# Patient Record
Sex: MALE | Race: WHITE | NOT HISPANIC OR LATINO | Employment: OTHER | URBAN - METROPOLITAN AREA
[De-identification: names, ages, dates, MRNs, and addresses within clinical notes are randomized per-mention and may not be internally consistent; named-entity substitution may affect disease eponyms.]

---

## 2018-04-25 ENCOUNTER — HOSPITAL ENCOUNTER (OUTPATIENT)
Dept: LAB | Facility: HOSPITAL | Age: 63
Discharge: HOME | End: 2018-04-25

## 2020-01-19 ENCOUNTER — HOSPITAL ENCOUNTER (INPATIENT)
Facility: HOSPITAL | Age: 65
LOS: 1 days | Discharge: HOME | End: 2020-01-22
Attending: EMERGENCY MEDICINE | Admitting: HOSPITALIST

## 2020-01-19 ENCOUNTER — APPOINTMENT (OUTPATIENT)
Dept: GENERAL RADIOLOGY | Facility: HOSPITAL | Age: 65
End: 2020-01-19

## 2020-01-19 DIAGNOSIS — R94.39 ABNORMAL NUCLEAR STRESS TEST: ICD-10-CM

## 2020-01-19 DIAGNOSIS — I20.8 ANGINA AT REST (HCC): ICD-10-CM

## 2020-01-19 DIAGNOSIS — R07.9 CHEST PAIN, UNSPECIFIED TYPE: Primary | ICD-10-CM

## 2020-01-19 DIAGNOSIS — F10.221: ICD-10-CM

## 2020-01-19 PROBLEM — I10 ESSENTIAL HYPERTENSION: Status: ACTIVE | Noted: 2020-01-19

## 2020-01-19 PROBLEM — K21.9 GERD WITHOUT ESOPHAGITIS: Status: ACTIVE | Noted: 2020-01-19

## 2020-01-19 PROBLEM — N40.0 BPH WITHOUT OBSTRUCTION/LOWER URINARY TRACT SYMPTOMS: Status: ACTIVE | Noted: 2020-01-19

## 2020-01-19 LAB
ALBUMIN SERPL-MCNC: 4.6 G/DL (ref 3.5–5.2)
ALBUMIN/GLOB SERPL: 1.2 G/DL
ALP SERPL-CCNC: 87 U/L (ref 39–117)
ALT SERPL W P-5'-P-CCNC: 7 U/L (ref 1–41)
AMPHET+METHAMPHET UR QL: NEGATIVE
ANION GAP SERPL CALCULATED.3IONS-SCNC: 20 MMOL/L (ref 5–15)
AST SERPL-CCNC: 22 U/L (ref 1–40)
BARBITURATES UR QL SCN: NEGATIVE
BASOPHILS # BLD AUTO: 0.1 10*3/MM3 (ref 0–0.2)
BASOPHILS NFR BLD AUTO: 0.9 % (ref 0–1.5)
BENZODIAZ UR QL SCN: NEGATIVE
BILIRUB SERPL-MCNC: 0.4 MG/DL (ref 0.2–1.2)
BUN BLD-MCNC: 22 MG/DL (ref 8–23)
BUN/CREAT SERPL: 20.8 (ref 7–25)
CALCIUM SPEC-SCNC: 9.7 MG/DL (ref 8.6–10.5)
CANNABINOIDS SERPL QL: NEGATIVE
CHLORIDE SERPL-SCNC: 101 MMOL/L (ref 98–107)
CO2 SERPL-SCNC: 23 MMOL/L (ref 22–29)
COCAINE UR QL: NEGATIVE
CREAT BLD-MCNC: 1.06 MG/DL (ref 0.76–1.27)
DEPRECATED RDW RBC AUTO: 49.4 FL (ref 37–54)
EOSINOPHIL # BLD AUTO: 0.2 10*3/MM3 (ref 0–0.4)
EOSINOPHIL NFR BLD AUTO: 2.4 % (ref 0.3–6.2)
ERYTHROCYTE [DISTWIDTH] IN BLOOD BY AUTOMATED COUNT: 14.4 % (ref 12.3–15.4)
ETHANOL UR QL: 0.19 %
GFR SERPL CREATININE-BSD FRML MDRD: 70 ML/MIN/1.73
GLOBULIN UR ELPH-MCNC: 3.8 GM/DL
GLUCOSE BLD-MCNC: 66 MG/DL (ref 65–99)
HCT VFR BLD AUTO: 43.9 % (ref 37.5–51)
HGB BLD-MCNC: 15.6 G/DL (ref 13–17.7)
LYMPHOCYTES # BLD AUTO: 3 10*3/MM3 (ref 0.7–3.1)
LYMPHOCYTES NFR BLD AUTO: 30.7 % (ref 19.6–45.3)
MCH RBC QN AUTO: 34.7 PG (ref 26.6–33)
MCHC RBC AUTO-ENTMCNC: 35.6 G/DL (ref 31.5–35.7)
MCV RBC AUTO: 97.4 FL (ref 79–97)
METHADONE UR QL SCN: NEGATIVE
MONOCYTES # BLD AUTO: 0.7 10*3/MM3 (ref 0.1–0.9)
MONOCYTES NFR BLD AUTO: 7.4 % (ref 5–12)
NEUTROPHILS # BLD AUTO: 5.7 10*3/MM3 (ref 1.7–7)
NEUTROPHILS NFR BLD AUTO: 58.6 % (ref 42.7–76)
NRBC BLD AUTO-RTO: 0.1 /100 WBC (ref 0–0.2)
OPIATES UR QL: NEGATIVE
OXYCODONE UR QL SCN: NEGATIVE
PLATELET # BLD AUTO: 306 10*3/MM3 (ref 140–450)
PMV BLD AUTO: 7.3 FL (ref 6–12)
POTASSIUM BLD-SCNC: 3.2 MMOL/L (ref 3.5–5.2)
PROT SERPL-MCNC: 8.4 G/DL (ref 6–8.5)
RBC # BLD AUTO: 4.5 10*6/MM3 (ref 4.14–5.8)
SODIUM BLD-SCNC: 144 MMOL/L (ref 136–145)
TROPONIN T SERPL-MCNC: <0.01 NG/ML (ref 0–0.03)
WBC NRBC COR # BLD: 9.8 10*3/MM3 (ref 3.4–10.8)
WHOLE BLOOD HOLD SPECIMEN: NORMAL

## 2020-01-19 PROCEDURE — G0378 HOSPITAL OBSERVATION PER HR: HCPCS

## 2020-01-19 PROCEDURE — 25010000002 HEPARIN (PORCINE) PER 1000 UNITS: Performed by: HOSPITALIST

## 2020-01-19 PROCEDURE — 80307 DRUG TEST PRSMV CHEM ANLYZR: CPT | Performed by: HOSPITALIST

## 2020-01-19 PROCEDURE — 25010000002 THIAMINE PER 100 MG: Performed by: EMERGENCY MEDICINE

## 2020-01-19 PROCEDURE — 80053 COMPREHEN METABOLIC PANEL: CPT | Performed by: EMERGENCY MEDICINE

## 2020-01-19 PROCEDURE — 25010000002 MAGNESIUM SULFATE PER 500 MG OF MAGNESIUM: Performed by: EMERGENCY MEDICINE

## 2020-01-19 PROCEDURE — 99219 PR INITIAL OBSERVATION CARE/DAY 50 MINUTES: CPT | Performed by: HOSPITALIST

## 2020-01-19 PROCEDURE — 84484 ASSAY OF TROPONIN QUANT: CPT | Performed by: EMERGENCY MEDICINE

## 2020-01-19 PROCEDURE — 93005 ELECTROCARDIOGRAM TRACING: CPT

## 2020-01-19 PROCEDURE — 80307 DRUG TEST PRSMV CHEM ANLYZR: CPT | Performed by: EMERGENCY MEDICINE

## 2020-01-19 PROCEDURE — 85025 COMPLETE CBC W/AUTO DIFF WBC: CPT | Performed by: EMERGENCY MEDICINE

## 2020-01-19 PROCEDURE — 71045 X-RAY EXAM CHEST 1 VIEW: CPT

## 2020-01-19 PROCEDURE — 99284 EMERGENCY DEPT VISIT MOD MDM: CPT

## 2020-01-19 PROCEDURE — 93005 ELECTROCARDIOGRAM TRACING: CPT | Performed by: EMERGENCY MEDICINE

## 2020-01-19 PROCEDURE — 84484 ASSAY OF TROPONIN QUANT: CPT | Performed by: HOSPITALIST

## 2020-01-19 RX ORDER — ASPIRIN 325 MG
325 TABLET ORAL ONCE
Status: COMPLETED | OUTPATIENT
Start: 2020-01-19 | End: 2020-01-19

## 2020-01-19 RX ORDER — DULOXETIN HYDROCHLORIDE 60 MG/1
60 CAPSULE, DELAYED RELEASE ORAL DAILY
COMMUNITY

## 2020-01-19 RX ORDER — TAMSULOSIN HYDROCHLORIDE 0.4 MG/1
0.8 CAPSULE ORAL DAILY
Status: DISCONTINUED | OUTPATIENT
Start: 2020-01-19 | End: 2020-01-22 | Stop reason: HOSPADM

## 2020-01-19 RX ORDER — HYDROCHLOROTHIAZIDE 25 MG/1
25 TABLET ORAL DAILY
COMMUNITY
End: 2020-04-06 | Stop reason: HOSPADM

## 2020-01-19 RX ORDER — TAMSULOSIN HYDROCHLORIDE 0.4 MG/1
2 CAPSULE ORAL DAILY
COMMUNITY

## 2020-01-19 RX ORDER — DULOXETIN HYDROCHLORIDE 30 MG/1
60 CAPSULE, DELAYED RELEASE ORAL DAILY
Status: DISCONTINUED | OUTPATIENT
Start: 2020-01-19 | End: 2020-01-22 | Stop reason: HOSPADM

## 2020-01-19 RX ORDER — OMEPRAZOLE 20 MG/1
20 CAPSULE, DELAYED RELEASE ORAL DAILY
COMMUNITY

## 2020-01-19 RX ORDER — HYDROCHLOROTHIAZIDE 25 MG/1
25 TABLET ORAL DAILY
Status: DISCONTINUED | OUTPATIENT
Start: 2020-01-19 | End: 2020-01-22 | Stop reason: HOSPADM

## 2020-01-19 RX ORDER — UBIDECARENONE 100 MG
100 CAPSULE ORAL DAILY
COMMUNITY

## 2020-01-19 RX ORDER — SODIUM CHLORIDE 0.9 % (FLUSH) 0.9 %
10 SYRINGE (ML) INJECTION EVERY 12 HOURS SCHEDULED
Status: DISCONTINUED | OUTPATIENT
Start: 2020-01-19 | End: 2020-01-22 | Stop reason: HOSPADM

## 2020-01-19 RX ORDER — ACETAMINOPHEN,DIPHENHYDRAMINE HCL 500; 25 MG/1; MG/1
1 TABLET, FILM COATED ORAL NIGHTLY PRN
COMMUNITY
End: 2020-01-22 | Stop reason: HOSPADM

## 2020-01-19 RX ORDER — HEPARIN SODIUM 5000 [USP'U]/ML
5000 INJECTION, SOLUTION INTRAVENOUS; SUBCUTANEOUS EVERY 12 HOURS SCHEDULED
Status: DISCONTINUED | OUTPATIENT
Start: 2020-01-19 | End: 2020-01-22 | Stop reason: HOSPADM

## 2020-01-19 RX ORDER — SODIUM CHLORIDE 0.9 % (FLUSH) 0.9 %
10 SYRINGE (ML) INJECTION AS NEEDED
Status: DISCONTINUED | OUTPATIENT
Start: 2020-01-19 | End: 2020-01-22 | Stop reason: HOSPADM

## 2020-01-19 RX ORDER — PANTOPRAZOLE SODIUM 40 MG/1
40 TABLET, DELAYED RELEASE ORAL EVERY MORNING
Status: DISCONTINUED | OUTPATIENT
Start: 2020-01-20 | End: 2020-01-22 | Stop reason: HOSPADM

## 2020-01-19 RX ADMIN — DULOXETINE HYDROCHLORIDE 60 MG: 30 CAPSULE, DELAYED RELEASE ORAL at 13:27

## 2020-01-19 RX ADMIN — Medication 10 ML: at 20:52

## 2020-01-19 RX ADMIN — FOLIC ACID 1000 ML/HR: 5 INJECTION, SOLUTION INTRAMUSCULAR; INTRAVENOUS; SUBCUTANEOUS at 11:49

## 2020-01-19 RX ADMIN — HYDROCHLOROTHIAZIDE 25 MG: 25 TABLET ORAL at 13:27

## 2020-01-19 RX ADMIN — NITROGLYCERIN 1 INCH: 20 OINTMENT TOPICAL at 09:51

## 2020-01-19 RX ADMIN — HEPARIN SODIUM 5000 UNITS: 5000 INJECTION INTRAVENOUS; SUBCUTANEOUS at 20:53

## 2020-01-19 RX ADMIN — THIAMINE HYDROCHLORIDE 100 MG: 100 INJECTION, SOLUTION INTRAMUSCULAR; INTRAVENOUS at 10:24

## 2020-01-19 RX ADMIN — TAMSULOSIN HYDROCHLORIDE 0.8 MG: 0.4 CAPSULE ORAL at 13:27

## 2020-01-19 RX ADMIN — Medication 10 ML: at 13:28

## 2020-01-19 RX ADMIN — ASPIRIN 325 MG ORAL TABLET 325 MG: 325 PILL ORAL at 09:51

## 2020-01-19 NOTE — NURSING NOTE
Patient's brother called earlier and I was giving pt care. Attempted to call brother but unsuccessful. Will try later.

## 2020-01-19 NOTE — ED NOTES
Medical records request for problem list and [facility] @[phone]     Kelsy iNck, RegSched Rep  01/19/20 1516

## 2020-01-19 NOTE — ED PROVIDER NOTES
Subjective   64-year-old male complaining of chest pain with shortness of breath and diaphoresis this morning.  He states his been having chest pain intermittently with exertion for about 2 weeks.  [facility] and he had to wait 2-1/2 hours in the ER to be seen so he told them to 'get Fucked' and left on his own accord.  He reports no recent fever chills or cough.  He denies diaphoresis with previous episodes but reports it today.  He also states that he has had palpitations for some time.  He states that he has not recently had a stress test          Review of Systems   HENT: Negative for sore throat.    Respiratory: Positive for chest tightness and shortness of breath. Negative for wheezing and stridor.    Cardiovascular: Positive for chest pain and palpitations. Negative for leg swelling.   Gastrointestinal: Negative for abdominal distention and abdominal pain.   Hematological: Does not bruise/bleed easily.   All other systems reviewed and are negative.      Past Medical History:   Diagnosis Date   • Enlarged prostate    • High cholesterol        No Known Allergies    History reviewed. No pertinent surgical history.    History reviewed. No pertinent family history.    Social History     Socioeconomic History   • Marital status:      Spouse name: Not on file   • Number of children: Not on file   • Years of education: Not on file   • Highest education level: Not on file           Objective   Physical Exam  Alert Fischer Coma Scale 15 appears intoxicated states he was drinking bourbon this morning   HEENT: Pupils equal and reactive to light.  Horizontal nystagmus is noted bilaterally conjunctivae are not injected. normal tympanic membranes. Oropharynx and nares are normal.   Neck: Supple. Midline trachea. No JVD. No goiter.   Chest: Clear and equal breath sounds bilaterally regular rate and rhythm without murmur or rub.   Abdomen: Positive bowel sounds nontender nondistended. No rebound  or peritoneal signs. No CVA tenderness.   Extremities no clubbing cyanosis or edema motor sensory exam is normal the full range of motion is intact.  No asterixis or liver flap   skin: Warm and dry, no rashes or petechia.   Lymphatic: No regional lymphadenopathy. No calf pain, swelling or Angel's sign    Procedures           ED Course  ED Course as of Jan 19 1107   Sun Jan 19, 2020   1100 Liver functions were noted to be normal    [TH]   1101 No additional chest pain after application of Nitropaste states he felt better with IV fluids    [TH]      ED Course User Index  [TH] Avtar Vo MD           Labs Reviewed   COMPREHENSIVE METABOLIC PANEL - Abnormal; Notable for the following components:       Result Value    Potassium 3.2 (*)     Anion Gap 20.0 (*)     All other components within normal limits    Narrative:     GFR Normal >60  Chronic Kidney Disease <60  Kidney Failure <15     CBC WITH AUTO DIFFERENTIAL - Abnormal; Notable for the following components:    MCV 97.4 (*)     MCH 34.7 (*)     All other components within normal limits   TROPONIN (IN-HOUSE) - Normal    Narrative:     Troponin T Reference Range:  <= 0.03 ng/mL-   Negative for AMI  >0.03 ng/mL-     Abnormal for myocardial necrosis.  Clinicians would have to utilize clinical acumen, EKG, Troponin and serial changes to determine if it is an Acute Myocardial Infarction or myocardial injury due to an underlying chronic condition.       Results may be falsely decreased if patient taking Biotin.     ETHANOL    Narrative:     Plasma Ethanol Clinical Symptoms:    ETOH (%)               Clinical Symptom  .01-.05              No apparent influence  .03-.12              Euphoria, Diminished judgment and attention   .09-.25              Impaired comprehension, Muscle incoordination  .18-.30              Confusion, Staggered gait, Slurred speech  .25-.40              Markedly decreased response to stimuli, unable to stand or                        walk,  vomitting, sleep or stupor  .35-.50              Comatose, Anesthesia, Subnormal body temperature       URINE DRUG SCREEN   CBC AND DIFFERENTIAL    Narrative:     The following orders were created for panel order CBC & Differential.  Procedure                               Abnormality         Status                     ---------                               -----------         ------                     CBC Auto Differential[[id number]]        Abnormal            Final result                 Please view results for these tests on the individual orders.   EXTRA TUBES    Narrative:     The following orders were created for panel order Extra Tubes.  Procedure                               Abnormality         Status                     ---------                               -----------         ------                     Light Blue Top[[id number]]                                   Final result                 Please view results for these tests on the individual orders.   LIGHT BLUE TOP     Medications   multiple vitamin (M.V.I. Adult) 10 mL, thiamine (B-1) 100 mg, folic acid 1 mg, magnesium sulfate 2 g in sodium chloride 0.9 % 1,000 mL infusion (has no administration in time range)   aspirin tablet 325 mg (325 mg Oral Given 1/19/20 0951)   nitroglycerin (NITROSTAT) ointment 1 inch (1 inch Topical Given 1/19/20 0951)   thiamine (B-1) 100 mg in sodium chloride 0.9 % 100 mL IVPB (0 mg Intravenous Stopped 1/19/20 1051)     Xr Chest 1 View    Result Date: 1/19/2020   1. Normal portable chest 2. I have no comparison  Electronically Signed By-Greg Becerril Jr. On:1/19/2020 10:18 AM This report was finalized on [id number] by  Greg Becerril Jr., .                                        MDM  Number of Diagnoses or Management Options     Amount and/or Complexity of Data Reviewed  Clinical lab tests: reviewed  Tests in the radiology section of CPT®: reviewed  Tests in the medicine section of CPT®: reviewed  Review and summarize  past medical records: yes  Discuss the patient with other providers: yes  Independent visualization of images, tracings, or specimens: yes    Risk of Complications, Morbidity, and/or Mortality  Presenting problems: high  Diagnostic procedures: high  Management options: high  General comments: [facility] however at time of dictation have not received any documentation.  The patient case was discussed the hospitalist the patient will be admitted for serial troponin and EKG.  The patient will need stress Myoview.  He is agreeable with this plan of treatment.        Final diagnoses:   Chest pain, unspecified type   Acute alcohol intoxication delirium with moderate or severe use disorder (CMS/[facility])            Avtar Vo MD  01/19/20 4182

## 2020-01-19 NOTE — H&P
[facility] Medicine Services      Patient Name: Rodolfo Regan  : 1955  MRN: [id number]  Primary Care Physician: Sancho Dover  Date of admission: 2020    Patient Care Team:  Sancho Dover as PCP - General (Internal Medicine)          Subjective   History Present Illness   Chest pain.     Chief Complaint:   Chief Complaint   Patient presents with   • Chest Pain     HPI.    Mr. Regan is a 64 y.o.  presents to [facility] complaint of chest pain, retrosternal, nonradiating.  No association with exertion, relieved somewhat by sublingual nitro.  Underwent EKG initial cardiomyopathy, negative.  Patient denies any previous diagnosis of coronary artery disease.  Patient does carries a medical diagnosis of hypertension, GERD, BPH.  Following this we were asked to admit the patient for the further care.         History of Present Illness    Review of Systems   Constitution: Negative.   HENT: Negative.    Eyes: Negative.    Cardiovascular: Negative.    Respiratory: Negative.    Endocrine: Negative.    Hematologic/Lymphatic: Negative.    Skin: Negative.    Musculoskeletal: Negative.    Gastrointestinal: Negative.    Genitourinary: Negative.    Neurological: Negative.    Psychiatric/Behavioral: Negative.    Allergic/Immunologic: Negative.    All other systems reviewed and are negative.          Personal History     Past Medical History:   Past Medical History:   Diagnosis Date   • Enlarged prostate    • High cholesterol    • Hypertension        Surgical History:      Past Surgical History:   Procedure Laterality Date   • COLONOSCOPY             Family History: family history includes Diabetes in his sister; Heart disease in his brother, father, and mother. Otherwise pertinent FHx was reviewed and unremarkable.     Social History:  reports that he has been smoking cigarettes. He has a 7.50 pack-year smoking history. He has never used smokeless tobacco. He reports that he drinks  about 10.0 standard drinks of alcohol per week. He reports that he does not use drugs.      Medications:  Prior to Admission medications    Medication Sig Start Date End Date Taking? Authorizing Provider   CBD oil (cannabidiol) capsule Take 1 capsule by mouth Daily.   Yes Teetee Garg MD   coenzyme Q10 100 MG capsule Take 100 mg by mouth Daily.   Yes Teetee Garg MD   diphenhydrAMINE-acetaminophen (TYLENOL PM)  MG tablet per tablet Take 1 tablet by mouth At Night As Needed for Sleep.   Yes Teetee Garg MD   DULoxetine (CYMBALTA) 60 MG capsule Take 60 mg by mouth Daily.   Yes Teetee Garg MD   hydroCHLOROthiazide (HYDRODIURIL) 25 MG tablet Take 25 mg by mouth Daily.   Yes Teetee Garg MD   Multiple Vitamins-Minerals (CENTRUM ADULTS PO) Take 1 tablet by mouth Daily.   Yes Teetee Garg MD   NON FORMULARY Take 1 tablet by mouth Daily. OTC Nitric Oxide Support   Yes Teetee Garg MD   omeprazole (priLOSEC) 20 MG capsule Take 20 mg by mouth Daily.   Yes Teetee Garg MD   tamsulosin (FLOMAX) 0.4 MG capsule 24 hr capsule Take 2 capsules by mouth Daily.   Yes Teetee Garg MD       Allergies:  No Known Allergies    Objective   Objective     Vital Signs  Temp:  [97.9 °F (36.6 °C)] 97.9 °F (36.6 °C)  Heart Rate:  [90] 90  Resp:  [15] 15  BP: (149)/(82) 149/82  SpO2:  [93 %] 93 %  on   ;      Body mass index is 25.85 kg/m².    Physical Exam   Constitutional: He is oriented to person, place, and time. He appears well-developed and well-nourished. No distress.   HENT:   Head: Normocephalic and atraumatic.   Right Ear: External ear normal.   Left Ear: External ear normal.   Nose: Nose normal.   Mouth/Throat: Oropharynx is clear and moist. No oropharyngeal exudate.   Eyes: Pupils are equal, round, and reactive to light. Conjunctivae and EOM are normal. Right eye exhibits no discharge. Left eye exhibits no discharge. No scleral icterus.   Neck:  Normal range of motion. No JVD present. No tracheal deviation present. No thyromegaly present.   Cardiovascular: Normal rate, regular rhythm, normal heart sounds and intact distal pulses. Exam reveals no gallop and no friction rub.   No murmur heard.  Pulmonary/Chest: Effort normal and breath sounds normal. No stridor. No respiratory distress. He has no wheezes. He has no rales. He exhibits no tenderness.   Abdominal: Soft. Bowel sounds are normal. He exhibits no distension and no mass. There is no tenderness. There is no rebound and no guarding. No hernia.   Musculoskeletal: Normal range of motion. He exhibits no edema, tenderness or deformity.   Lymphadenopathy:     He has no cervical adenopathy.   Neurological: He is alert and oriented to person, place, and time. No cranial nerve deficit or sensory deficit. He exhibits normal muscle tone. Coordination normal.   Skin: Skin is warm and dry. No rash noted. He is not diaphoretic. No erythema.   Psychiatric: He has a normal mood and affect. His behavior is normal.   Nursing note and vitals reviewed.      Results Review:  I have personally reviewed most recent lab results and agree with findings, most notably: .    Results from last 7 days   Lab Units 01/19/20  0920   WBC 10*3/mm3 9.80   HEMOGLOBIN g/dL 15.6   HEMATOCRIT % 43.9   PLATELETS 10*3/mm3 306     Results from last 7 days   Lab Units 01/19/20  0920   SODIUM mmol/L 144   POTASSIUM mmol/L 3.2*   CHLORIDE mmol/L 101   CO2 mmol/L 23.0   BUN mg/dL 22   CREATININE mg/dL 1.06   GLUCOSE mg/dL 66   CALCIUM mg/dL 9.7   ALT (SGPT) U/L 7   AST (SGOT) U/L 22   TROPONIN T ng/mL <0.010     Estimated Creatinine Clearance: 75.8 mL/min (by C-G formula based on SCr of 1.06 mg/dL).  Brief Urine Lab Results     None          Microbiology Results (last 10 days)     ** No results found for the last 240 hours. **          ECG/EMG Results (most recent)     Procedure Component Value Units Date/Time    ECG 12 Lead [[id number]] Collected:   01/19/20 0920     Updated:  01/19/20 0922    Narrative:       HEART RATE= 94  bpm  RR Interval= 640  ms  SD Interval= 144  ms  P Horizontal Axis= -53  deg  P Front Axis= 54  deg  QRSD Interval= 74  ms  QT Interval= 361  ms  QRS Axis= 52  deg  T Wave Axis= 64  deg  - ABNORMAL ECG -  Sinus rhythm  Anterior infarct, age indeterminate  Electronically Signed By:   Date and Time of Study: 2020-01-19 09:20:49                    Xr Chest 1 View    Result Date: 1/19/2020   1. Normal portable chest 2. I have no comparison  Electronically Signed By-Greg Becerril Jr. On:1/19/2020 10:18 AM This report was finalized on [id number] by  Greg Becerril Jr., .        Estimated Creatinine Clearance: 75.8 mL/min (by C-G formula based on SCr of 1.06 mg/dL).    Assessment/Plan   Assessment/Plan       Active Hospital Problems    Diagnosis  POA   • Chest pain [R07.9]  Yes      Resolved Hospital Problems   No resolved problems to display.       Active Hospital Problems:  No notes have been filed under this hospital service.  Service: Hospitalist    Assessment / Plan    Chest pain rule out acute kidney syndrome, telemetry, serial markers, echo stress test in the morning.    Hypertension, continue hydrochlorothiazide, monitor vitals.    BPH, continue Flomax, monitor for retention.    Gastroesophageal for disease, continue Protonix monitor for symptoms.          VTE Prophylaxis - SCDs.    CODE STATUS:    Code Status and Medical Interventions:   Ordered at: 01/19/20 1255     Level Of Support Discussed With:    Patient     Code Status:    CPR     Medical Interventions (Level of Support Prior to Arrest):    Full       Admission Status:  I believe this patient meets  observation  criteria.      I discussed the patient's findings and my recommendations with patient and family.        Electronically signed by Susana Taylor MD, 01/19/20, 12:55 PM.  [facility] Hospitalist Team

## 2020-01-19 NOTE — PLAN OF CARE
Problem: Patient Care Overview  Goal: Plan of Care Review  Flowsheets (Taken 1/19/2020 1257)  Plan of Care Reviewed With: patient  Note:   Educated pt to room, call light, and condition. Pt alert but ETOH protocol in place. Pt calm and cooperative at this time.   Goal: Discharge Needs Assessment  Flowsheets  Taken 1/19/2020 1257  Equipment Needed After Discharge: none  Transportation Anticipated: family or friend will provide  Transportation Concerns: car, none  Current Discharge Risk: psychiatric illness  Concerns to be Addressed: substance/tobacco abuse/use;mental health  Readmission Within the Last 30 Days: no previous admission in last 30 days  Taken 1/19/2020 1201  Equipment Currently Used at Home: none  Taken 1/19/2020 1204  Patient/Family Anticipated Services at Transition: mental health services  Patient/Family Anticipates Transition to: home with family  Note:   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      Problem: Cardiac: ACS (Acute Coronary Syndrome) (Adult)  Goal: Signs and Symptoms of Listed Potential Problems Will be Absent, Minimized or Managed (Cardiac: ACS)  Flowsheets (Taken 1/19/2020 1257)  Problems Assessed (Acute Coronary Syndrome): all  Problems Present (Acute Coronary Syn): chest pain (angina)  Note:   Denies pain at this time. Reports weakness with no history of falls but periods of blacking out at home. Pt lives at home with brother.      Problem: Alcohol Withdrawal Acute, Risk/Actual (Adult)  Goal: Signs and Symptoms of Listed Potential Problems Will be Absent, Minimized or Managed (Alcohol Withdrawal Acute, Risk/Actual)  Flowsheets (Taken 1/19/2020 1257)  Problems Assessed (Alcohol Withdrawal Syndrome): all  Problems Present (Alcohol W/D Syndrome): effects of MELVA (alcohol withdrawal syndrome)  Note:   Alert and oriented. Calm. Pt reports drinking all last night into this morning. Unsure of how much he drank.

## 2020-01-20 ENCOUNTER — APPOINTMENT (OUTPATIENT)
Dept: NUCLEAR MEDICINE | Facility: HOSPITAL | Age: 65
End: 2020-01-20

## 2020-01-20 ENCOUNTER — APPOINTMENT (OUTPATIENT)
Dept: CARDIOLOGY | Facility: HOSPITAL | Age: 65
End: 2020-01-20

## 2020-01-20 LAB
BH CV NUCLEAR PRIOR STUDY: 3
BH CV STRESS BP STAGE 1: NORMAL
BH CV STRESS BP STAGE 2: NORMAL
BH CV STRESS DURATION MIN STAGE 1: 3
BH CV STRESS DURATION MIN STAGE 2: 3
BH CV STRESS DURATION SEC STAGE 1: 0
BH CV STRESS DURATION SEC STAGE 2: 0
BH CV STRESS GRADE STAGE 1: 10
BH CV STRESS GRADE STAGE 2: 12
BH CV STRESS HR STAGE 1: 141
BH CV STRESS HR STAGE 2: 146
BH CV STRESS METS STAGE 1: 5
BH CV STRESS METS STAGE 2: 7.5
BH CV STRESS PROTOCOL 1: NORMAL
BH CV STRESS RECOVERY BP: NORMAL MMHG
BH CV STRESS RECOVERY HR: 102 BPM
BH CV STRESS SPEED STAGE 1: 1.7
BH CV STRESS SPEED STAGE 2: 2.5
BH CV STRESS STAGE 1: 1
BH CV STRESS STAGE 2: 2
LV EF NUC BP: 56 %
MAXIMAL PREDICTED HEART RATE: 155 BPM
PERCENT MAX PREDICTED HR: 94.19 %
STRESS BASELINE BP: NORMAL MMHG
STRESS BASELINE HR: 118 BPM
STRESS PERCENT HR: 111 %
STRESS POST ESTIMATED WORKLOAD: 4.6 METS
STRESS POST EXERCISE DUR MIN: 3 MIN
STRESS POST EXERCISE DUR SEC: 36 SEC
STRESS POST PEAK BP: NORMAL MMHG
STRESS POST PEAK HR: 146 BPM
STRESS TARGET HR: 132 BPM
TROPONIN T SERPL-MCNC: <0.01 NG/ML (ref 0–0.03)

## 2020-01-20 PROCEDURE — 93018 CV STRESS TEST I&R ONLY: CPT | Performed by: INTERNAL MEDICINE

## 2020-01-20 PROCEDURE — G0378 HOSPITAL OBSERVATION PER HR: HCPCS

## 2020-01-20 PROCEDURE — 93306 TTE W/DOPPLER COMPLETE: CPT

## 2020-01-20 PROCEDURE — 93016 CV STRESS TEST SUPVJ ONLY: CPT | Performed by: NURSE PRACTITIONER

## 2020-01-20 PROCEDURE — 78452 HT MUSCLE IMAGE SPECT MULT: CPT | Performed by: INTERNAL MEDICINE

## 2020-01-20 PROCEDURE — 93017 CV STRESS TEST TRACING ONLY: CPT

## 2020-01-20 PROCEDURE — 84484 ASSAY OF TROPONIN QUANT: CPT | Performed by: HOSPITALIST

## 2020-01-20 PROCEDURE — 25010000002 HEPARIN (PORCINE) PER 1000 UNITS: Performed by: HOSPITALIST

## 2020-01-20 PROCEDURE — A9500 TC99M SESTAMIBI: HCPCS | Performed by: INTERNAL MEDICINE

## 2020-01-20 PROCEDURE — 0 TECHNETIUM SESTAMIBI: Performed by: INTERNAL MEDICINE

## 2020-01-20 PROCEDURE — 78452 HT MUSCLE IMAGE SPECT MULT: CPT

## 2020-01-20 PROCEDURE — 93306 TTE W/DOPPLER COMPLETE: CPT | Performed by: INTERNAL MEDICINE

## 2020-01-20 PROCEDURE — 99222 1ST HOSP IP/OBS MODERATE 55: CPT | Performed by: INTERNAL MEDICINE

## 2020-01-20 PROCEDURE — 99225 PR SBSQ OBSERVATION CARE/DAY 25 MINUTES: CPT | Performed by: INTERNAL MEDICINE

## 2020-01-20 RX ORDER — POTASSIUM CHLORIDE 20 MEQ/1
20 TABLET, EXTENDED RELEASE ORAL ONCE
Status: COMPLETED | OUTPATIENT
Start: 2020-01-20 | End: 2020-01-20

## 2020-01-20 RX ORDER — MIDAZOLAM HYDROCHLORIDE 1 MG/ML
1 INJECTION INTRAMUSCULAR; INTRAVENOUS ONCE
Status: CANCELLED | OUTPATIENT
Start: 2020-01-20 | End: 2020-01-20

## 2020-01-20 RX ADMIN — PANTOPRAZOLE SODIUM 40 MG: 40 TABLET, DELAYED RELEASE ORAL at 06:04

## 2020-01-20 RX ADMIN — TECHNETIUM TC 99M SESTAMIBI 1 DOSE: 1 INJECTION INTRAVENOUS at 09:40

## 2020-01-20 RX ADMIN — HEPARIN SODIUM 5000 UNITS: 5000 INJECTION INTRAVENOUS; SUBCUTANEOUS at 21:33

## 2020-01-20 RX ADMIN — TAMSULOSIN HYDROCHLORIDE 0.8 MG: 0.4 CAPSULE ORAL at 08:43

## 2020-01-20 RX ADMIN — POTASSIUM CHLORIDE 20 MEQ: 1500 TABLET, EXTENDED RELEASE ORAL at 18:17

## 2020-01-20 RX ADMIN — HEPARIN SODIUM 5000 UNITS: 5000 INJECTION INTRAVENOUS; SUBCUTANEOUS at 08:43

## 2020-01-20 RX ADMIN — Medication 10 ML: at 08:44

## 2020-01-20 RX ADMIN — HYDROCHLOROTHIAZIDE 25 MG: 25 TABLET ORAL at 08:43

## 2020-01-20 RX ADMIN — DULOXETINE HYDROCHLORIDE 60 MG: 30 CAPSULE, DELAYED RELEASE ORAL at 08:43

## 2020-01-20 RX ADMIN — TECHNETIUM TC 99M SESTAMIBI 1 DOSE: 1 INJECTION INTRAVENOUS at 12:00

## 2020-01-20 RX ADMIN — Medication 10 ML: at 21:34

## 2020-01-20 NOTE — PLAN OF CARE
Problem: Patient Care Overview  Goal: Plan of Care Review  Flowsheets (Taken 1/20/2020 0402)  Progress: no change  Plan of Care Reviewed With: patient  Note:   Patient rested well throughout the night without complaints of pain.  He has been NPO since midnight for a stress test. Shows no signs of alcohol withdraw.  Will continue to monitor.

## 2020-01-20 NOTE — PROGRESS NOTES
Discharge Planning Assessment   [facility]     Patient Name: Rodolfo Regan  MRN: [id number]  Today's Date: 1/20/2020    Admit Date: 1/19/2020    Discharge Needs Assessment     Row Name 01/20/20 1441       Living Environment    Lives With  alone    Current Living Arrangements  home/apartment/condo    Primary Care Provided by  self    Provides Primary Care For  no one    Family Caregiver if Needed  sibling(s)    Able to Return to Prior Arrangements  yes       Transition Planning    Patient/Family Anticipates Transition to  home       Discharge Needs Assessment    Readmission Within the Last 30 Days  no previous admission in last 30 days    Concerns to be Addressed  no discharge needs identified    Equipment Needed After Discharge  none        Discharge Plan     Row Name 01/20/20 1437       Plan    Plan  Return home    Plan Comments  Return home        Destination      Coordination has not been started for this encounter.      Durable Medical Equipment       Demographic Summary     Row Name 01/20/20 1438       General Information    Admission Type  observation    Arrived From  emergency department    Referral Source  admission list    Reason for Consult  discharge planning    Preferred Language  English        Functional Status     Row Name 01/20/20 1440       Functional Status    Usual Activity Tolerance  good    Current Activity Tolerance  good       Functional Status, IADL    Medications  independent       Mental Status    General Appearance WDL  WDL   Plan to return home upon discharge pending stress test results    Sunshine Romero RN

## 2020-01-20 NOTE — PROGRESS NOTES
"      [facility] Medicine Services Daily Progress Note      Hospitalist Team  LOS 0 days      Patient Care Team:  Sancho Dover as PCP - General (Internal Medicine)    Patient Location: 356/1      Subjective   Subjective     Chief Complaint / Subjective  Chief Complaint   Patient presents with   • Chest Pain       Present on Admission:  • Chest pain      Brief Synopsis of Hospital Course/HPI    64-year-old man with medical history significant for hypertension GERD and BPH.  Presented to the ED with complaints of chest pain which is retrosternal, intermittent, nonradiating with no known aggravating factor but relief with sublingual nitro.  After evaluated in the ED, patient was admitted for further care.          Date:: 1/20/2020  Continues with intermittent substernal chest pain  Had Myoview stress test today which was abnormal  Cardiologist consulted        Review of Systems   Constitution: Negative for chills and fever.   HENT: Negative for congestion.    Cardiovascular: Positive for chest pain.   Respiratory: Negative for shortness of breath.    Gastrointestinal: Negative for nausea.   Genitourinary: Negative for dysuria.   Psychiatric/Behavioral: Negative for altered mental status.         Objective   Objective      Vital Signs  Temp:  [98.1 °F (36.7 °C)-98.2 °F (36.8 °C)] 98.1 °F (36.7 °C)  Heart Rate:  [79-86] 79  Resp:  [16-17] 17  BP: (126-152)/(70-75) 134/72  Oxygen Therapy  SpO2: 96 %  Pulse Oximetry Type: Intermittent  Device (Oxygen Therapy): room air  Flowsheet Rows      First Filed Value   Admission Height  172.7 cm (68\") Documented at 01/19/2020 0916   Admission Weight  77.1 kg (170 lb) Documented at 01/19/2020 0916        Intake & Output (last 3 days)       01/17 0701 - 01/18 0700 01/18 0701 - 01/19 0700 01/19 0701 - 01/20 0700 01/20 0701 - 01/21 0700    P.O.    0    IV Piggyback   100     Total Intake(mL/kg)   100 (1.3) 0 (0)    Urine (mL/kg/hr)   890 400 (0.5)    Total Output   " 890 400    Net   -790 -400                Lines, Drains & Airways    Active LDAs     Name:   Placement date:   Placement time:   Site:   Days:    Peripheral IV 01/19/20 0920 Left Antecubital   01/19/20 0920    Antecubital   1                  Physical Exam:    Physical Exam   Constitutional: He is oriented to person, place, and time. He appears well-developed. No distress.   HENT:   Head: Normocephalic and atraumatic.   Eyes: Pupils are equal, round, and reactive to light. EOM are normal.   Neck: Neck supple.   Cardiovascular: Normal rate and regular rhythm.   Pulmonary/Chest: Effort normal and breath sounds normal.   Abdominal: Soft. Bowel sounds are normal.   Musculoskeletal: Normal range of motion.   Neurological: He is alert and oriented to person, place, and time.   Skin: Skin is warm and dry.   Psychiatric: He has a normal mood and affect.   Nursing note and vitals reviewed.            Procedures:              Results Review:     I reviewed the patient's new clinical results.      Lab Results (last 24 hours)     Procedure Component Value Units Date/Time    Troponin [[id number]]  (Normal) Collected:  01/20/20 0424    Specimen:  Blood Updated:  01/20/20 0527     Troponin T <0.010 ng/mL     Narrative:       Troponin T Reference Range:  <= 0.03 ng/mL-   Negative for AMI  >0.03 ng/mL-     Abnormal for myocardial necrosis.  Clinicians would have to utilize clinical acumen, EKG, Troponin and serial changes to determine if it is an Acute Myocardial Infarction or myocardial injury due to an underlying chronic condition.       Results may be falsely decreased if patient taking Biotin.      Troponin [[id number]]  (Normal) Collected:  01/19/20 2141    Specimen:  Blood Updated:  01/19/20 2213     Troponin T <0.010 ng/mL     Narrative:       Troponin T Reference Range:  <= 0.03 ng/mL-   Negative for AMI  >0.03 ng/mL-     Abnormal for myocardial necrosis.  Clinicians would have to utilize clinical acumen, EKG, Troponin and  serial changes to determine if it is an Acute Myocardial Infarction or myocardial injury due to an underlying chronic condition.       Results may be falsely decreased if patient taking Biotin.      Troponin [[id number]]  (Normal) Collected:  01/19/20 1639    Specimen:  Blood Updated:  01/19/20 1751     Troponin T <0.010 ng/mL     Narrative:       Troponin T Reference Range:  <= 0.03 ng/mL-   Negative for AMI  >0.03 ng/mL-     Abnormal for myocardial necrosis.  Clinicians would have to utilize clinical acumen, EKG, Troponin and serial changes to determine if it is an Acute Myocardial Infarction or myocardial injury due to an underlying chronic condition.       Results may be falsely decreased if patient taking Biotin.          No results found for: HGBA1C            No results found for: LIPASE  No results found for: CHOL, CHLPL, TRIG, HDL, LDL, LDLDIRECT    No results found for: INTRAOP, PREDX, FINALDX, COMDX    Microbiology Results (last 10 days)     ** No results found for the last 240 hours. **          ECG/EMG Results (most recent)     Procedure Component Value Units Date/Time    ECG 12 Lead [[id number]] Collected:  01/19/20 0920     Updated:  01/19/20 1407    Narrative:       HEART RATE= 94  bpm  RR Interval= 640  ms  MD Interval= 144  ms  P Horizontal Axis= -53  deg  P Front Axis= 54  deg  QRSD Interval= 74  ms  QT Interval= 361  ms  QRS Axis= 52  deg  T Wave Axis= 64  deg  - ABNORMAL ECG -  Sinus rhythm  Anterior infarct, age indeterminate  No previous ECG available for comparison  Electronically Signed By: Avtar Vo (Tony) 19-Jan-2020 14:07:27  Date and Time of Study: 2020-01-19 09:20:49    Adult Transthoracic Echo Complete W/ Cont if Necessary Per Protocol [[id number]] Collected:  01/20/20 1256     Updated:  01/20/20 1409     BSA 1.9 m^2      BH CV ECHO NANDO - RVDD 2.8 cm      IVSd 1.1 cm      LVIDd 4.5 cm      LVIDs 3.1 cm      LVPWd 1.3 cm      IVS/LVPW 0.89     FS 29.6 %      EDV(Teich) 90.7 ml       ESV(Teich) 39.3 ml      EF(Teich) 56.7 %      EDV(cubed) 88.9 ml      ESV(cubed) 31.1 ml      EF(cubed) 65.0 %      LV mass(C)d 192.9 grams      LV mass(C)dI 101.6 grams/m^2      SV(Teich) 51.4 ml      SI(Teich) 27.1 ml/m^2      SV(cubed) 57.8 ml      SI(cubed) 30.5 ml/m^2      Ao root diam 3.0 cm      Ao root area 7.2 cm^2      ACS 2.2 cm      asc Aorta Diam 3.0 cm      LVOT diam 2.5 cm      LVOT area 4.8 cm^2      RVOT diam 2.0 cm      RVOT area 3.3 cm^2      EDV(MOD-sp4) 60.7 ml      ESV(MOD-sp4) 21.8 ml      EF(MOD-sp4) 64.0 %      EDV(MOD-sp2) 72.3 ml      ESV(MOD-sp2) 27.4 ml      EF(MOD-sp2) 62.1 %      SV(MOD-sp4) 38.9 ml      SI(MOD-sp4) 20.5 ml/m^2      SV(MOD-sp2) 44.9 ml      SI(MOD-sp2) 23.6 ml/m^2      Ao root area (BSA corrected) 1.6     LV Brown Vol (BSA corrected) 32.0 ml/m^2      LV Sys Vol (BSA corrected) 11.5 ml/m^2      Aortic R-R 0.65 sec      Aortic HR 92.2 BPM      MV E max trey 58.4 cm/sec      MV A max trey 89.0 cm/sec      MV E/A 0.66     MV V2 max 78.1 cm/sec      MV max PG 2.4 mmHg      MV V2 mean 56.6 cm/sec      MV mean PG 1.4 mmHg      MV V2 VTI 17.5 cm      MVA(VTI) 4.7 cm^2      MV dec slope 160.1 cm/sec^2      MV dec time 0.36 sec      Ao pk trey 122.5 cm/sec      Ao max PG 6.0 mmHg      Ao max PG (full) 1.6 mmHg      Ao V2 mean 92.0 cm/sec      Ao mean PG 3.6 mmHg      Ao mean PG (full) 1.7 mmHg      Ao V2 VTI 19.6 cm      SHELIA(I,A) 4.2 cm^2      SHELIA(I,D) 4.2 cm^2      SHELIA(V,A) 4.1 cm^2      SHELIA(V,D) 4.1 cm^2      LV V1 max PG 4.4 mmHg      LV V1 mean PG 2.0 mmHg      LV V1 max 104.8 cm/sec      LV V1 mean 64.5 cm/sec      LV V1 VTI 17.2 cm      CO(Ao) 12.9 l/min      CI(Ao) 6.8 l/min/m^2      SV(Ao) 140.5 ml      SI(Ao) 74.0 ml/m^2      CO(LVOT) 7.6 l/min      CI(LVOT) 4.0 l/min/m^2      SV(LVOT) 82.5 ml      SV(RVOT) 48.2 ml      SI(LVOT) 43.5 ml/m^2      PA V2 max 96.1 cm/sec      PA max PG 3.7 mmHg      PA max PG (full) 0.8 mmHg      PA V2 mean 76.9 cm/sec      PA mean PG 2.5 mmHg       PA mean PG (full) 0.87 mmHg      PA V2 VTI 18.4 cm      PVA(I,A) 2.6 cm^2       CV ECHO NANDO - PVA(I,D) 2.6 cm^2       CV ECHO NANDO - PVA(V,A) 2.9 cm^2       CV ECHO NANDO - PVA(V,D) 2.9 cm^2      PA acc time 0.11 sec      RV V1 max PG 2.9 mmHg      RV V1 mean PG 1.6 mmHg      RV V1 max 85.1 cm/sec      RV V1 mean 58.7 cm/sec      RV V1 VTI 14.7 cm      TR max trey 242.3 cm/sec      RVSP(TR) 31.5 mmHg      RAP systole 8.0 mmHg      PA pr(Accel) 31.0 mmHg      Qp/Qs 0.58      CV ECHO NANDO - BZI_BMI 25.5 kilograms/m^2       CV ECHO NANDO - BSA(HAYCOCK) 1.9 m^2       CV ECHO NANDO - BZI_METRIC_WEIGHT 76.2 kg       CV ECHO NANDO - BZI_METRIC_HEIGHT 172.7 cm      EF(MOD-bp) 61.0 %      LA dimension(2D) 2.7 cm                     Xr Chest 1 View    Result Date: 1/19/2020   1. Normal portable chest 2. I have no comparison  Electronically Signed By-Greg Becerril Jr. On:1/19/2020 10:18 AM This report was finalized on [id number] by  Greg Becerril Jr., .          Xrays, labs reviewed personally by physician.    Medication Review:   I have reviewed the patient's current medication list      Scheduled Meds    DULoxetine 60 mg Oral Daily   heparin (porcine) 5,000 Units Subcutaneous Q12H   hydroCHLOROthiazide 25 mg Oral Daily   pantoprazole 40 mg Oral QAM   sodium chloride 10 mL Intravenous Q12H   tamsulosin 0.8 mg Oral Daily       Meds Infusions       Meds PRN  sodium chloride    I personally reviewed patient's x-ray films   Assessment/Plan   Assessment/Plan     Active Hospital Problems    Diagnosis  POA   • **Chest pain [R07.9]  Yes   • Essential hypertension [I10]  Unknown   • GERD without esophagitis [K21.9]  Unknown   • BPH without obstruction/lower urinary tract symptoms [N40.0]  Unknown      Resolved Hospital Problems   No resolved problems to display.       MEDICAL DECISION MAKING COMPLEXITY BY PROBLEM:     Chest pain  Rule out acute coronary syndrome  Serial troponin insignificant  EKG showed no acute ST/T  wave changes  Myoview stress test  1/20-reported as abnormal though official report is pending  Cardiologist consulted    Hypertension-blood pressure controlled  On hydrochlorothiazide    GERD-PPI      Hypokalemia-replace  Repeat BMP in the a.m.    Depression-on Cymbalta    BPH-on Flomax      DVT prophylaxis with heparin subcut          Code Status -   Code Status and Medical Interventions:   Ordered at: 01/19/20 1255     Level Of Support Discussed With:    Patient     Code Status:    CPR     Medical Interventions (Level of Support Prior to Arrest):    Full       Discharge Planning          Destination      Coordination has not been started for this encounter.      Durable Medical Equipment      Coordination has not been started for this encounter.      Dialysis/Infusion      Coordination has not been started for this encounter.      Home Medical Care      Coordination has not been started for this encounter.      Therapy      Coordination has not been started for this encounter.      Community Resources      Coordination has not been started for this encounter.            Electronically signed by Dong Johansen MD, 01/20/20, 5:22 PM.  [facility] Hospitalist Team

## 2020-01-21 PROBLEM — I20.8 ANGINA AT REST: Status: ACTIVE | Noted: 2020-01-19

## 2020-01-21 PROBLEM — R94.39 ABNORMAL NUCLEAR STRESS TEST: Status: ACTIVE | Noted: 2020-01-19

## 2020-01-21 LAB
ALBUMIN SERPL-MCNC: 4 G/DL (ref 3.5–5.2)
ALBUMIN/GLOB SERPL: 1.3 G/DL
ALP SERPL-CCNC: 73 U/L (ref 39–117)
ALT SERPL W P-5'-P-CCNC: 5 U/L (ref 1–41)
ANION GAP SERPL CALCULATED.3IONS-SCNC: 15 MMOL/L (ref 5–15)
AST SERPL-CCNC: 17 U/L (ref 1–40)
BASOPHILS # BLD AUTO: 0.1 10*3/MM3 (ref 0–0.2)
BASOPHILS NFR BLD AUTO: 0.8 % (ref 0–1.5)
BH CV ECHO MEAS - ACS: 2.2 CM
BH CV ECHO MEAS - AO MAX PG (FULL): 1.6 MMHG
BH CV ECHO MEAS - AO MAX PG: 6 MMHG
BH CV ECHO MEAS - AO MEAN PG (FULL): 1.7 MMHG
BH CV ECHO MEAS - AO MEAN PG: 3.6 MMHG
BH CV ECHO MEAS - AO ROOT AREA (BSA CORRECTED): 1.6
BH CV ECHO MEAS - AO ROOT AREA: 7.2 CM^2
BH CV ECHO MEAS - AO ROOT DIAM: 3 CM
BH CV ECHO MEAS - AO V2 MAX: 122.5 CM/SEC
BH CV ECHO MEAS - AO V2 MEAN: 92 CM/SEC
BH CV ECHO MEAS - AO V2 VTI: 19.6 CM
BH CV ECHO MEAS - AORTIC HR: 92.2 BPM
BH CV ECHO MEAS - AORTIC R-R: 0.65 SEC
BH CV ECHO MEAS - ASC AORTA: 3 CM
BH CV ECHO MEAS - AVA(I,A): 4.2 CM^2
BH CV ECHO MEAS - AVA(I,D): 4.2 CM^2
BH CV ECHO MEAS - AVA(V,A): 4.1 CM^2
BH CV ECHO MEAS - AVA(V,D): 4.1 CM^2
BH CV ECHO MEAS - BSA(HAYCOCK): 1.9 M^2
BH CV ECHO MEAS - BSA: 1.9 M^2
BH CV ECHO MEAS - BZI_BMI: 25.5 KILOGRAMS/M^2
BH CV ECHO MEAS - BZI_METRIC_HEIGHT: 172.7 CM
BH CV ECHO MEAS - BZI_METRIC_WEIGHT: 76.2 KG
BH CV ECHO MEAS - CI(AO): 6.8 L/MIN/M^2
BH CV ECHO MEAS - CI(LVOT): 4 L/MIN/M^2
BH CV ECHO MEAS - CO(AO): 12.9 L/MIN
BH CV ECHO MEAS - CO(LVOT): 7.6 L/MIN
BH CV ECHO MEAS - EDV(CUBED): 88.9 ML
BH CV ECHO MEAS - EDV(MOD-SP2): 72.3 ML
BH CV ECHO MEAS - EDV(MOD-SP4): 60.7 ML
BH CV ECHO MEAS - EDV(TEICH): 90.7 ML
BH CV ECHO MEAS - EF(CUBED): 65 %
BH CV ECHO MEAS - EF(MOD-BP): 61 %
BH CV ECHO MEAS - EF(MOD-SP2): 62.1 %
BH CV ECHO MEAS - EF(MOD-SP4): 64 %
BH CV ECHO MEAS - EF(TEICH): 56.7 %
BH CV ECHO MEAS - ESV(CUBED): 31.1 ML
BH CV ECHO MEAS - ESV(MOD-SP2): 27.4 ML
BH CV ECHO MEAS - ESV(MOD-SP4): 21.8 ML
BH CV ECHO MEAS - ESV(TEICH): 39.3 ML
BH CV ECHO MEAS - FS: 29.6 %
BH CV ECHO MEAS - IVS/LVPW: 0.89
BH CV ECHO MEAS - IVSD: 1.1 CM
BH CV ECHO MEAS - LA DIMENSION(2D): 2.7 CM
BH CV ECHO MEAS - LV DIASTOLIC VOL/BSA (35-75): 32 ML/M^2
BH CV ECHO MEAS - LV MASS(C)D: 192.9 GRAMS
BH CV ECHO MEAS - LV MASS(C)DI: 101.6 GRAMS/M^2
BH CV ECHO MEAS - LV MAX PG: 4.4 MMHG
BH CV ECHO MEAS - LV MEAN PG: 2 MMHG
BH CV ECHO MEAS - LV SYSTOLIC VOL/BSA (12-30): 11.5 ML/M^2
BH CV ECHO MEAS - LV V1 MAX: 104.8 CM/SEC
BH CV ECHO MEAS - LV V1 MEAN: 64.5 CM/SEC
BH CV ECHO MEAS - LV V1 VTI: 17.2 CM
BH CV ECHO MEAS - LVIDD: 4.5 CM
BH CV ECHO MEAS - LVIDS: 3.1 CM
BH CV ECHO MEAS - LVOT AREA: 4.8 CM^2
BH CV ECHO MEAS - LVOT DIAM: 2.5 CM
BH CV ECHO MEAS - LVPWD: 1.3 CM
BH CV ECHO MEAS - MV A MAX VEL: 89 CM/SEC
BH CV ECHO MEAS - MV DEC SLOPE: 160.1 CM/SEC^2
BH CV ECHO MEAS - MV DEC TIME: 0.36 SEC
BH CV ECHO MEAS - MV E MAX VEL: 58.4 CM/SEC
BH CV ECHO MEAS - MV E/A: 0.66
BH CV ECHO MEAS - MV MAX PG: 2.4 MMHG
BH CV ECHO MEAS - MV MEAN PG: 1.4 MMHG
BH CV ECHO MEAS - MV V2 MAX: 78.1 CM/SEC
BH CV ECHO MEAS - MV V2 MEAN: 56.6 CM/SEC
BH CV ECHO MEAS - MV V2 VTI: 17.5 CM
BH CV ECHO MEAS - MVA(VTI): 4.7 CM^2
BH CV ECHO MEAS - PA ACC TIME: 0.11 SEC
BH CV ECHO MEAS - PA MAX PG (FULL): 0.8 MMHG
BH CV ECHO MEAS - PA MAX PG: 3.7 MMHG
BH CV ECHO MEAS - PA MEAN PG (FULL): 0.87 MMHG
BH CV ECHO MEAS - PA MEAN PG: 2.5 MMHG
BH CV ECHO MEAS - PA PR(ACCEL): 31 MMHG
BH CV ECHO MEAS - PA V2 MAX: 96.1 CM/SEC
BH CV ECHO MEAS - PA V2 MEAN: 76.9 CM/SEC
BH CV ECHO MEAS - PA V2 VTI: 18.4 CM
BH CV ECHO MEAS - PVA(I,A): 2.6 CM^2
BH CV ECHO MEAS - PVA(I,D): 2.6 CM^2
BH CV ECHO MEAS - PVA(V,A): 2.9 CM^2
BH CV ECHO MEAS - PVA(V,D): 2.9 CM^2
BH CV ECHO MEAS - QP/QS: 0.58
BH CV ECHO MEAS - RAP SYSTOLE: 8 MMHG
BH CV ECHO MEAS - RV MAX PG: 2.9 MMHG
BH CV ECHO MEAS - RV MEAN PG: 1.6 MMHG
BH CV ECHO MEAS - RV V1 MAX: 85.1 CM/SEC
BH CV ECHO MEAS - RV V1 MEAN: 58.7 CM/SEC
BH CV ECHO MEAS - RV V1 VTI: 14.7 CM
BH CV ECHO MEAS - RVDD: 2.8 CM
BH CV ECHO MEAS - RVOT AREA: 3.3 CM^2
BH CV ECHO MEAS - RVOT DIAM: 2 CM
BH CV ECHO MEAS - RVSP: 31.5 MMHG
BH CV ECHO MEAS - SI(AO): 74 ML/M^2
BH CV ECHO MEAS - SI(CUBED): 30.5 ML/M^2
BH CV ECHO MEAS - SI(LVOT): 43.5 ML/M^2
BH CV ECHO MEAS - SI(MOD-SP2): 23.6 ML/M^2
BH CV ECHO MEAS - SI(MOD-SP4): 20.5 ML/M^2
BH CV ECHO MEAS - SI(TEICH): 27.1 ML/M^2
BH CV ECHO MEAS - SV(AO): 140.5 ML
BH CV ECHO MEAS - SV(CUBED): 57.8 ML
BH CV ECHO MEAS - SV(LVOT): 82.5 ML
BH CV ECHO MEAS - SV(MOD-SP2): 44.9 ML
BH CV ECHO MEAS - SV(MOD-SP4): 38.9 ML
BH CV ECHO MEAS - SV(RVOT): 48.2 ML
BH CV ECHO MEAS - SV(TEICH): 51.4 ML
BH CV ECHO MEAS - TR MAX VEL: 242.3 CM/SEC
BILIRUB SERPL-MCNC: 0.6 MG/DL (ref 0.2–1.2)
BILIRUB UR QL STRIP: NEGATIVE
BUN BLD-MCNC: 11 MG/DL (ref 8–23)
BUN/CREAT SERPL: 16.2 (ref 7–25)
CALCIUM SPEC-SCNC: 9.6 MG/DL (ref 8.6–10.5)
CHLORIDE SERPL-SCNC: 99 MMOL/L (ref 98–107)
CLARITY UR: CLEAR
CO2 SERPL-SCNC: 24 MMOL/L (ref 22–29)
COLOR UR: YELLOW
CREAT BLD-MCNC: 0.68 MG/DL (ref 0.76–1.27)
DEPRECATED RDW RBC AUTO: 46.8 FL (ref 37–54)
EOSINOPHIL # BLD AUTO: 0.1 10*3/MM3 (ref 0–0.4)
EOSINOPHIL NFR BLD AUTO: 1.6 % (ref 0.3–6.2)
ERYTHROCYTE [DISTWIDTH] IN BLOOD BY AUTOMATED COUNT: 13.8 % (ref 12.3–15.4)
GFR SERPL CREATININE-BSD FRML MDRD: 117 ML/MIN/1.73
GLOBULIN UR ELPH-MCNC: 3.2 GM/DL
GLUCOSE BLD-MCNC: 104 MG/DL (ref 65–99)
GLUCOSE UR STRIP-MCNC: NEGATIVE MG/DL
HCT VFR BLD AUTO: 37 % (ref 37.5–51)
HGB BLD-MCNC: 13.3 G/DL (ref 13–17.7)
HGB UR QL STRIP.AUTO: NEGATIVE
INR PPP: 0.87 (ref 0.9–1.1)
KETONES UR QL STRIP: NEGATIVE
LEUKOCYTE ESTERASE UR QL STRIP.AUTO: NEGATIVE
LYMPHOCYTES # BLD AUTO: 1.9 10*3/MM3 (ref 0.7–3.1)
LYMPHOCYTES NFR BLD AUTO: 27.7 % (ref 19.6–45.3)
MCH RBC QN AUTO: 34.3 PG (ref 26.6–33)
MCHC RBC AUTO-ENTMCNC: 35.8 G/DL (ref 31.5–35.7)
MCV RBC AUTO: 95.8 FL (ref 79–97)
MONOCYTES # BLD AUTO: 0.7 10*3/MM3 (ref 0.1–0.9)
MONOCYTES NFR BLD AUTO: 9.7 % (ref 5–12)
NEUTROPHILS # BLD AUTO: 4.1 10*3/MM3 (ref 1.7–7)
NEUTROPHILS NFR BLD AUTO: 60.2 % (ref 42.7–76)
NITRITE UR QL STRIP: NEGATIVE
NRBC BLD AUTO-RTO: 0 /100 WBC (ref 0–0.2)
PH UR STRIP.AUTO: 6 [PH] (ref 5–8)
PLATELET # BLD AUTO: 222 10*3/MM3 (ref 140–450)
PMV BLD AUTO: 7.7 FL (ref 6–12)
POTASSIUM BLD-SCNC: 3.4 MMOL/L (ref 3.5–5.2)
PROT SERPL-MCNC: 7.2 G/DL (ref 6–8.5)
PROT UR QL STRIP: NEGATIVE
PROTHROMBIN TIME: 9.4 SECONDS (ref 9.6–11.7)
RBC # BLD AUTO: 3.86 10*6/MM3 (ref 4.14–5.8)
SODIUM BLD-SCNC: 138 MMOL/L (ref 136–145)
SP GR UR STRIP: 1.01 (ref 1–1.03)
UROBILINOGEN UR QL STRIP: NORMAL
WBC NRBC COR # BLD: 6.8 10*3/MM3 (ref 3.4–10.8)

## 2020-01-21 PROCEDURE — 85025 COMPLETE CBC W/AUTO DIFF WBC: CPT | Performed by: INTERNAL MEDICINE

## 2020-01-21 PROCEDURE — 25010000002 HEPARIN (PORCINE) PER 1000 UNITS: Performed by: INTERNAL MEDICINE

## 2020-01-21 PROCEDURE — 93458 L HRT ARTERY/VENTRICLE ANGIO: CPT | Performed by: INTERNAL MEDICINE

## 2020-01-21 PROCEDURE — 81003 URINALYSIS AUTO W/O SCOPE: CPT | Performed by: INTERNAL MEDICINE

## 2020-01-21 PROCEDURE — 99232 SBSQ HOSP IP/OBS MODERATE 35: CPT | Performed by: INTERNAL MEDICINE

## 2020-01-21 PROCEDURE — 87086 URINE CULTURE/COLONY COUNT: CPT | Performed by: INTERNAL MEDICINE

## 2020-01-21 PROCEDURE — B2111ZZ FLUOROSCOPY OF MULTIPLE CORONARY ARTERIES USING LOW OSMOLAR CONTRAST: ICD-10-PCS | Performed by: INTERNAL MEDICINE

## 2020-01-21 PROCEDURE — 85610 PROTHROMBIN TIME: CPT | Performed by: INTERNAL MEDICINE

## 2020-01-21 PROCEDURE — C1769 GUIDE WIRE: HCPCS | Performed by: INTERNAL MEDICINE

## 2020-01-21 PROCEDURE — 80053 COMPREHEN METABOLIC PANEL: CPT | Performed by: INTERNAL MEDICINE

## 2020-01-21 PROCEDURE — 25010000002 MIDAZOLAM PER 1 MG: Performed by: INTERNAL MEDICINE

## 2020-01-21 PROCEDURE — C1894 INTRO/SHEATH, NON-LASER: HCPCS | Performed by: INTERNAL MEDICINE

## 2020-01-21 PROCEDURE — 25010000002 FENTANYL CITRATE (PF) 100 MCG/2ML SOLUTION: Performed by: INTERNAL MEDICINE

## 2020-01-21 PROCEDURE — 0 IOPAMIDOL PER 1 ML: Performed by: INTERNAL MEDICINE

## 2020-01-21 PROCEDURE — 4A023N7 MEASUREMENT OF CARDIAC SAMPLING AND PRESSURE, LEFT HEART, PERCUTANEOUS APPROACH: ICD-10-PCS | Performed by: INTERNAL MEDICINE

## 2020-01-21 RX ORDER — POTASSIUM CHLORIDE 20 MEQ/1
40 TABLET, EXTENDED RELEASE ORAL AS NEEDED
Status: DISCONTINUED | OUTPATIENT
Start: 2020-01-21 | End: 2020-01-22 | Stop reason: HOSPADM

## 2020-01-21 RX ORDER — SODIUM CHLORIDE 9 MG/ML
75 INJECTION, SOLUTION INTRAVENOUS CONTINUOUS
Status: DISCONTINUED | OUTPATIENT
Start: 2020-01-21 | End: 2020-01-22 | Stop reason: HOSPADM

## 2020-01-21 RX ORDER — POTASSIUM CHLORIDE 1.5 G/1.77G
40 POWDER, FOR SOLUTION ORAL AS NEEDED
Status: DISCONTINUED | OUTPATIENT
Start: 2020-01-21 | End: 2020-01-22 | Stop reason: HOSPADM

## 2020-01-21 RX ORDER — FENTANYL CITRATE 50 UG/ML
INJECTION, SOLUTION INTRAMUSCULAR; INTRAVENOUS AS NEEDED
Status: DISCONTINUED | OUTPATIENT
Start: 2020-01-21 | End: 2020-01-21 | Stop reason: HOSPADM

## 2020-01-21 RX ORDER — MIDAZOLAM HYDROCHLORIDE 1 MG/ML
INJECTION INTRAMUSCULAR; INTRAVENOUS AS NEEDED
Status: DISCONTINUED | OUTPATIENT
Start: 2020-01-21 | End: 2020-01-21 | Stop reason: HOSPADM

## 2020-01-21 RX ORDER — LIDOCAINE HYDROCHLORIDE 20 MG/ML
INJECTION, SOLUTION INFILTRATION; PERINEURAL AS NEEDED
Status: DISCONTINUED | OUTPATIENT
Start: 2020-01-21 | End: 2020-01-21 | Stop reason: HOSPADM

## 2020-01-21 RX ORDER — SODIUM CHLORIDE 9 MG/ML
250 INJECTION, SOLUTION INTRAVENOUS ONCE AS NEEDED
Status: DISCONTINUED | OUTPATIENT
Start: 2020-01-21 | End: 2020-01-22 | Stop reason: HOSPADM

## 2020-01-21 RX ORDER — ATROPINE SULFATE 1 MG/ML
0.5 INJECTION, SOLUTION INTRAMUSCULAR; INTRAVENOUS; SUBCUTANEOUS
Status: DISCONTINUED | OUTPATIENT
Start: 2020-01-21 | End: 2020-01-22 | Stop reason: HOSPADM

## 2020-01-21 RX ADMIN — HEPARIN SODIUM 5000 UNITS: 5000 INJECTION INTRAVENOUS; SUBCUTANEOUS at 23:02

## 2020-01-21 RX ADMIN — PANTOPRAZOLE SODIUM 40 MG: 40 TABLET, DELAYED RELEASE ORAL at 08:05

## 2020-01-21 RX ADMIN — Medication 10 ML: at 23:10

## 2020-01-21 RX ADMIN — POTASSIUM CHLORIDE 40 MEQ: 1500 TABLET, EXTENDED RELEASE ORAL at 10:13

## 2020-01-21 RX ADMIN — POTASSIUM CHLORIDE 40 MEQ: 1500 TABLET, EXTENDED RELEASE ORAL at 14:25

## 2020-01-21 RX ADMIN — DULOXETINE HYDROCHLORIDE 60 MG: 30 CAPSULE, DELAYED RELEASE ORAL at 08:05

## 2020-01-21 RX ADMIN — HYDROCHLOROTHIAZIDE 25 MG: 25 TABLET ORAL at 08:05

## 2020-01-21 RX ADMIN — SODIUM CHLORIDE 75 ML/HR: 900 INJECTION, SOLUTION INTRAVENOUS at 23:05

## 2020-01-21 RX ADMIN — TAMSULOSIN HYDROCHLORIDE 0.8 MG: 0.4 CAPSULE ORAL at 08:05

## 2020-01-21 RX ADMIN — Medication 10 ML: at 08:05

## 2020-01-21 NOTE — PROGRESS NOTES
Discharge Planning Assessment   [facility]     Patient Name: Rodolfo Regan  MRN: [id number]  Today's Date: 1/21/2020    Admit Date: 1/19/2020      BARRIERS TO DISCHARGE IS PENDING  HEART CATH TODAY       Carol naegele rn  Case management  Office number [phone]  Cell phone [phone]

## 2020-01-21 NOTE — PROGRESS NOTES
"Referring Provider: Hospitalist    Reason for follow-up: Chest pain, abnormal Myoview     Patient Care Team:  Sancho Dover as PCP - General (Internal Medicine)    Subjective .  No chest pain or shortness of breath    Objective  Lying in bed comfortably     Review of Systems   Constitution: Negative for fever and malaise/fatigue.   Cardiovascular: Negative for chest pain, dyspnea on exertion and palpitations.   Respiratory: Negative for cough and shortness of breath.    Skin: Negative for rash.   Gastrointestinal: Negative for abdominal pain, nausea and vomiting.   Neurological: Negative for focal weakness and headaches.   All other systems reviewed and are negative.      Patient has no known allergies.    Scheduled Meds:  [MAR Hold] DULoxetine 60 mg Oral Daily   [MAR Hold] heparin (porcine) 5,000 Units Subcutaneous Q12H   [MAR Hold] hydroCHLOROthiazide 25 mg Oral Daily   [MAR Hold] pantoprazole 40 mg Oral QAM   [MAR Hold] sodium chloride 10 mL Intravenous Q12H   [MAR Hold] tamsulosin 0.8 mg Oral Daily     Continuous Infusions:   PRN Meds:.[MAR Hold] potassium chloride  •  potassium chloride  •  [MAR Hold] sodium chloride        VITAL SIGNS  Vitals:    01/20/20 0843 01/20/20 1906 01/21/20 0415 01/21/20 1300   BP: 134/72 126/81 145/81 131/82   BP Location:  Right arm Left arm    Patient Position:  Lying Lying Lying   Pulse: 79 86 73 77   Resp:  17 18 12   Temp:  97.8 °F (36.6 °C) 98.4 °F (36.9 °C) 98.1 °F (36.7 °C)   TempSrc:  Oral Oral Oral   SpO2:  96% 95% 96%   Weight:   75.2 kg (165 lb 12.6 oz)    Height:           Flowsheet Rows      First Filed Value   Admission Height  172.7 cm (68\") Documented at 01/19/2020 0916   Admission Weight  77.1 kg (170 lb) Documented at 01/19/2020 0916           TELEMETRY: Normal sinus rhythm    Physical Exam:  Physical Exam   Constitutional: He appears well-developed and well-nourished.   HENT:   Head: Normocephalic and atraumatic.   Eyes: Conjunctivae are normal. No scleral " icterus.   Neck: Normal range of motion. Neck supple. No JVD present. Carotid bruit is not present.   Cardiovascular: Normal rate, regular rhythm, S1 normal, S2 normal, normal heart sounds and intact distal pulses. PMI is not displaced.   Pulmonary/Chest: Effort normal and breath sounds normal. He has no wheezes. He has no rales.   Abdominal: Soft. Bowel sounds are normal.   Neurological: He is alert. He has normal strength.   Skin: Skin is warm and dry. No rash noted.        Results Review:   I reviewed the patient's new clinical results.  Lab Results (last 24 hours)     Procedure Component Value Units Date/Time    Comprehensive Metabolic Panel [[id number]]  (Abnormal) Collected:  01/21/20 0344    Specimen:  Blood Updated:  01/21/20 0600     Glucose 104 mg/dL      BUN 11 mg/dL      Creatinine 0.68 mg/dL      Sodium 138 mmol/L      Potassium 3.4 mmol/L      Chloride 99 mmol/L      CO2 24.0 mmol/L      Calcium 9.6 mg/dL      Total Protein 7.2 g/dL      Albumin 4.00 g/dL      ALT (SGPT) 5 U/L      AST (SGOT) 17 U/L      Alkaline Phosphatase 73 U/L      Total Bilirubin 0.6 mg/dL      eGFR Non African Amer 117 mL/min/1.73      Globulin 3.2 gm/dL      A/G Ratio 1.3 g/dL      BUN/Creatinine Ratio 16.2     Anion Gap 15.0 mmol/L     Narrative:       GFR Normal >60  Chronic Kidney Disease <60  Kidney Failure <15      Protime-INR [[id number]]  (Abnormal) Collected:  01/21/20 0344    Specimen:  Blood Updated:  01/21/20 0555     Protime 9.4 Seconds      INR 0.87    CBC & Differential [[id number]] Collected:  01/21/20 0344    Specimen:  Blood Updated:  01/21/20 0554    Narrative:       The following orders were created for panel order CBC & Differential.  Procedure                               Abnormality         Status                     ---------                               -----------         ------                     CBC Auto Differential[[id number]]        Abnormal            Final result                 Please view  results for these tests on the individual orders.    CBC Auto Differential [[id number]]  (Abnormal) Collected:  01/21/20 0344    Specimen:  Blood Updated:  01/21/20 0554     WBC 6.80 10*3/mm3      RBC 3.86 10*6/mm3      Hemoglobin 13.3 g/dL      Comment: Result checked         Hematocrit 37.0 %      MCV 95.8 fL      MCH 34.3 pg      MCHC 35.8 g/dL      RDW 13.8 %      RDW-SD 46.8 fl      MPV 7.7 fL      Platelets 222 10*3/mm3      Neutrophil % 60.2 %      Lymphocyte % 27.7 %      Monocyte % 9.7 %      Eosinophil % 1.6 %      Basophil % 0.8 %      Neutrophils, Absolute 4.10 10*3/mm3      Lymphocytes, Absolute 1.90 10*3/mm3      Monocytes, Absolute 0.70 10*3/mm3      Eosinophils, Absolute 0.10 10*3/mm3      Basophils, Absolute 0.10 10*3/mm3      nRBC 0.0 /100 WBC     Urinalysis With Culture If Indicated - Urine, Clean Catch [[id number]]  (Normal) Collected:  01/21/20 0212    Specimen:  Urine, Clean Catch Updated:  01/21/20 0231     Color, UA Yellow     Appearance, UA Clear     pH, UA 6.0     Specific Gravity, UA 1.009     Glucose, UA Negative     Ketones, UA Negative     Bilirubin, UA Negative     Blood, UA Negative     Protein, UA Negative     Leuk Esterase, UA Negative     Nitrite, UA Negative     Urobilinogen, UA 0.2 E.U./dL    Narrative:       Urine microscopic not indicated.    Urine Culture - Urine, Urine, Clean Catch [[id number]] Collected:  01/21/20 0212    Specimen:  Urine, Clean Catch Updated:  01/21/20 0231    MRSA Screen, PCR - Swab, Nares [[id number]] Updated:  01/21/20 0148    Specimen:  Swab from Nares           Imaging Results (Last 24 Hours)     ** No results found for the last 24 hours. **          EKG      I personally viewed and interpreted the patient's EKG/Telemetry data:    ECHOCARDIOGRAM:    STRESS MYOVIEW:    CARDIAC CATHETERIZATION:    OTHER:         Assessment/Plan     #1 chest pain with abnormal Myoview study  2.  Hypertension  3.  Gastroesophageal fixed disease  4.  BPH     Patient is ruled  out for MI by EKG and enzymes  Patient had a cardiac catheterization today which showed nonobstructive coronary artery disease with normal LV function  Patient's blood pressure and heart rate stable  Continue current medical therapy and follow him as an outpatient   I discussed the patients findings and my recommendations with patient and nurse    Yannick Paige MD  01/21/20  5:56 PM

## 2020-01-21 NOTE — CONSULTS
Referring Provider: Hospitalist  Reason for Consultation: Chest pain and abnormal Myoview    Patient Care Team:  Sancho Dover as PCP - General (Internal Medicine)    Chief complaint chest pain    Subjective .     History of present illness:  Rodolfo Regan is a 64 y.o. male with history of hypertension hyperlipidemia presented to the hospital with complaints of chest pain and shortness of breath on and off for the last several days.  Patient described chest pain as a substernal discomfort without any radiation but is with shortness of breath.  No complains of any PND orthopnea.  No palpitations dizziness syncope or swelling of the feet.  Patient has been taking his medicine regularly.  When he presented to the ER patient was admitted and ruled out for MI by EKG enzymes.  Patient had a stress Myoview which is abnormal and hence a cardiology consult was called.    Review of Systems   Constitution: Negative for fever and malaise/fatigue.   HENT: Negative for ear pain and nosebleeds.    Eyes: Negative for blurred vision and double vision.   Cardiovascular: Positive for chest pain. Negative for dyspnea on exertion and palpitations.   Respiratory: Positive for shortness of breath. Negative for cough.    Skin: Negative for rash.   Musculoskeletal: Negative for joint pain.   Gastrointestinal: Negative for abdominal pain, nausea and vomiting.   Neurological: Negative for focal weakness and headaches.   Psychiatric/Behavioral: Negative for depression. The patient is not nervous/anxious.    All other systems reviewed and are negative.      History  Past Medical History:   Diagnosis Date   • Enlarged prostate    • High cholesterol    • Hypertension        Past Surgical History:   Procedure Laterality Date   • COLONOSCOPY         Family History   Problem Relation Age of Onset   • Heart disease Mother    • Heart disease Father    • Diabetes Sister    • Heart disease Brother        Social History     Tobacco Use   • Smoking  status: Current Every Day Smoker     Packs/day: 0.50     Years: 15.00     Pack years: 7.50     Types: Cigarettes   • Smokeless tobacco: Never Used   Substance Use Topics   • Alcohol use: Yes     Alcohol/week: 10.0 standard drinks     Types: 10 Shots of liquor per week   • Drug use: Never        Medications Prior to Admission   Medication Sig Dispense Refill Last Dose   • CBD oil (cannabidiol) capsule Take 1 capsule by mouth Daily.   1/18/2020 at Unknown time   • coenzyme Q10 100 MG capsule Take 100 mg by mouth Daily.   1/18/2020 at Unknown time   • diphenhydrAMINE-acetaminophen (TYLENOL PM)  MG tablet per tablet Take 1 tablet by mouth At Night As Needed for Sleep.   1/18/2020 at Unknown time   • DULoxetine (CYMBALTA) 60 MG capsule Take 60 mg by mouth Daily.   1/18/2020 at Unknown time   • hydroCHLOROthiazide (HYDRODIURIL) 25 MG tablet Take 25 mg by mouth Daily.   1/18/2020 at Unknown time   • Multiple Vitamins-Minerals (CENTRUM ADULTS PO) Take 1 tablet by mouth Daily.   1/18/2020 at Unknown time   • NON FORMULARY Take 1 tablet by mouth Daily. OTC Nitric Oxide Support   1/18/2020 at Unknown time   • omeprazole (priLOSEC) 20 MG capsule Take 20 mg by mouth Daily.   1/18/2020 at Unknown time   • tamsulosin (FLOMAX) 0.4 MG capsule 24 hr capsule Take 2 capsules by mouth Daily.   1/18/2020 at Unknown time         Patient has no known allergies.    Scheduled Meds:    DULoxetine 60 mg Oral Daily   heparin (porcine) 5,000 Units Subcutaneous Q12H   hydroCHLOROthiazide 25 mg Oral Daily   pantoprazole 40 mg Oral QAM   sodium chloride 10 mL Intravenous Q12H   tamsulosin 0.8 mg Oral Daily     Continuous Infusions:   PRN Meds:.sodium chloride    Objective     VITAL SIGNS  Vitals:    01/19/20 1256 01/19/20 2019 01/20/20 0351 01/20/20 0843   BP: 106/65 126/70 152/75 134/72   BP Location: Right arm Right arm Right arm    Patient Position: Lying Lying Lying    Pulse: 101 86 82 79   Resp: 15 16 17    Temp: 97.9 °F (36.6 °C) 98.2  "°F (36.8 °C) 98.1 °F (36.7 °C)    TempSrc: Oral Oral Oral    SpO2: 93% 94% 96%    Weight:   76.5 kg (168 lb 10.4 oz)    Height:           Flowsheet Rows      First Filed Value   Admission Height  172.7 cm (68\") Documented at 01/19/2020 0916   Admission Weight  77.1 kg (170 lb) Documented at 01/19/2020 0916           TELEMETRY: Sinus rhythm with nonspecific ST segment amenity    Physical Exam:  Physical Exam   Constitutional: He appears well-developed and well-nourished.   HENT:   Head: Normocephalic and atraumatic.   Eyes: Pupils are equal, round, and reactive to light. Conjunctivae and EOM are normal. No scleral icterus.   Neck: Normal range of motion. Neck supple. No JVD present. Carotid bruit is not present.   Cardiovascular: Normal rate, regular rhythm, S1 normal, S2 normal, normal heart sounds and intact distal pulses. PMI is not displaced.   Pulmonary/Chest: Effort normal and breath sounds normal. He has no wheezes. He has no rales.   Abdominal: Soft. Bowel sounds are normal.   Musculoskeletal: Normal range of motion.   Neurological: He is alert. He has normal strength.   No focal deficits   Skin: Skin is warm and dry. No rash noted.   Psychiatric: He has a normal mood and affect.        Results Review:   I reviewed the patient's new clinical results.  Lab Results (last 24 hours)     Procedure Component Value Units Date/Time    Troponin [[id number]]  (Normal) Collected:  01/20/20 0424    Specimen:  Blood Updated:  01/20/20 0527     Troponin T <0.010 ng/mL     Narrative:       Troponin T Reference Range:  <= 0.03 ng/mL-   Negative for AMI  >0.03 ng/mL-     Abnormal for myocardial necrosis.  Clinicians would have to utilize clinical acumen, EKG, Troponin and serial changes to determine if it is an Acute Myocardial Infarction or myocardial injury due to an underlying chronic condition.       Results may be falsely decreased if patient taking Biotin.      Troponin [[id number]]  (Normal) Collected:  01/19/20 2141    " Specimen:  Blood Updated:  01/19/20 2213     Troponin T <0.010 ng/mL     Narrative:       Troponin T Reference Range:  <= 0.03 ng/mL-   Negative for AMI  >0.03 ng/mL-     Abnormal for myocardial necrosis.  Clinicians would have to utilize clinical acumen, EKG, Troponin and serial changes to determine if it is an Acute Myocardial Infarction or myocardial injury due to an underlying chronic condition.       Results may be falsely decreased if patient taking Biotin.            Imaging Results (Last 24 Hours)     ** No results found for the last 24 hours. **          EKG      I personally viewed and interpreted the patient's EKG/Telemetry data:    ECHOCARDIOGRAM:      STRESS MYOVIEW:    CARDIAC CATHETERIZATION:    OTHER:         Assessment/Plan     #1 chest pain typical of angina  2.  Hypertension  3.  History of BPH  4.  Gastroesophageal reflux disease     Patient presents with chest pain typical of angina and he ruled out for MI by EKG and enzymes  Patient had a stress Myoview which is abnormal with ischemia  Discussed with patient about cardiac catheterization.  Procedure risks and benefits have been explained  Patient is currently on medical therapy with aspirin nitrates  We will check his lipid profile also  Blood pressure and heart are stable  Further treatment based on cardiac catheterization finding  I discussed the patients findings and my recommendations with patient and nurse    Yannick Paige MD  01/20/20  7:01 PM

## 2020-01-21 NOTE — PROGRESS NOTES
"      [facility] Medicine Services Daily Progress Note      Hospitalist Team  LOS 0 days      Patient Care Team:  Sancho Dover as PCP - General (Internal Medicine)    Patient Location: 356/1      Subjective   Subjective     Chief Complaint / Subjective  Chief Complaint   Patient presents with   • Chest Pain       Present on Admission:  • Chest pain      Brief Synopsis of Hospital Course/HPI    64-year-old man with medical history significant for hypertension, GERD and BPH.  Presented to the ED with complaints of chest pain which is retrosternal, intermittent, nonradiating with no known aggravating factor but relief with sublingual nitro.  After evaluated in the ED, patient was admitted for further care.          Date:: 1/21/2020  Continues with intermittent substernal chest pain  Had Myoview stress 1/20-abnormal  Cardiologist following  Scheduled for cardiac catheterization later today      Review of Systems   Constitution: Negative for chills and fever.   HENT: Negative for congestion.    Cardiovascular: Positive for chest pain.   Respiratory: Negative for shortness of breath.    Gastrointestinal: Negative for nausea.   Genitourinary: Negative for dysuria.   Psychiatric/Behavioral: Negative for altered mental status.         Objective   Objective      Vital Signs  Temp:  [97.8 °F (36.6 °C)-98.4 °F (36.9 °C)] 98.4 °F (36.9 °C)  Heart Rate:  [73-86] 73  Resp:  [17-18] 18  BP: (126-145)/(81) 145/81  Oxygen Therapy  SpO2: 95 %  Pulse Oximetry Type: Intermittent  Device (Oxygen Therapy): room air  Flowsheet Rows      First Filed Value   Admission Height  172.7 cm (68\") Documented at 01/19/2020 0916   Admission Weight  77.1 kg (170 lb) Documented at 01/19/2020 0916        Intake & Output (last 3 days)       01/18 0701 - 01/19 0700 01/19 0701 - 01/20 0700 01/20 0701 - 01/21 0700 01/21 0701 - 01/22 0700    P.O.   240     IV Piggyback  100      Total Intake(mL/kg)  100 (1.3) 240 (3.2)     Urine " (mL/kg/hr)  890 1175 (0.7)     Total Output  890 1175     Net  -790 -155                 Lines, Drains & Airways    Active LDAs     Name:   Placement date:   Placement time:   Site:   Days:    Peripheral IV 01/19/20 0920 Left Antecubital   01/19/20 0920    Antecubital   1                  Physical Exam:    Physical Exam   Constitutional: He is oriented to person, place, and time. He appears well-developed. No distress.   HENT:   Head: Normocephalic and atraumatic.   Eyes: Pupils are equal, round, and reactive to light. EOM are normal.   Neck: Neck supple.   Cardiovascular: Normal rate and regular rhythm.   Pulmonary/Chest: Effort normal and breath sounds normal.   Abdominal: Soft. Bowel sounds are normal.   Musculoskeletal: Normal range of motion.   Neurological: He is alert and oriented to person, place, and time.   Skin: Skin is warm and dry.   Psychiatric: He has a normal mood and affect.   Nursing note and vitals reviewed.            Procedures:              Results Review:     I reviewed the patient's new clinical results.      Lab Results (last 24 hours)     Procedure Component Value Units Date/Time    Comprehensive Metabolic Panel [[id number]]  (Abnormal) Collected:  01/21/20 0344    Specimen:  Blood Updated:  01/21/20 0600     Glucose 104 mg/dL      BUN 11 mg/dL      Creatinine 0.68 mg/dL      Sodium 138 mmol/L      Potassium 3.4 mmol/L      Chloride 99 mmol/L      CO2 24.0 mmol/L      Calcium 9.6 mg/dL      Total Protein 7.2 g/dL      Albumin 4.00 g/dL      ALT (SGPT) 5 U/L      AST (SGOT) 17 U/L      Alkaline Phosphatase 73 U/L      Total Bilirubin 0.6 mg/dL      eGFR Non African Amer 117 mL/min/1.73      Globulin 3.2 gm/dL      A/G Ratio 1.3 g/dL      BUN/Creatinine Ratio 16.2     Anion Gap 15.0 mmol/L     Narrative:       GFR Normal >60  Chronic Kidney Disease <60  Kidney Failure <15      Protime-INR [[id number]]  (Abnormal) Collected:  01/21/20 0344    Specimen:  Blood Updated:  01/21/20 0555      Protime 9.4 Seconds      INR 0.87    CBC & Differential [[id number]] Collected:  01/21/20 0344    Specimen:  Blood Updated:  01/21/20 0554    Narrative:       The following orders were created for panel order CBC & Differential.  Procedure                               Abnormality         Status                     ---------                               -----------         ------                     CBC Auto Differential[[id number]]        Abnormal            Final result                 Please view results for these tests on the individual orders.    CBC Auto Differential [[id number]]  (Abnormal) Collected:  01/21/20 0344    Specimen:  Blood Updated:  01/21/20 0554     WBC 6.80 10*3/mm3      RBC 3.86 10*6/mm3      Hemoglobin 13.3 g/dL      Comment: Result checked         Hematocrit 37.0 %      MCV 95.8 fL      MCH 34.3 pg      MCHC 35.8 g/dL      RDW 13.8 %      RDW-SD 46.8 fl      MPV 7.7 fL      Platelets 222 10*3/mm3      Neutrophil % 60.2 %      Lymphocyte % 27.7 %      Monocyte % 9.7 %      Eosinophil % 1.6 %      Basophil % 0.8 %      Neutrophils, Absolute 4.10 10*3/mm3      Lymphocytes, Absolute 1.90 10*3/mm3      Monocytes, Absolute 0.70 10*3/mm3      Eosinophils, Absolute 0.10 10*3/mm3      Basophils, Absolute 0.10 10*3/mm3      nRBC 0.0 /100 WBC     Urinalysis With Culture If Indicated - Urine, Clean Catch [[id number]]  (Normal) Collected:  01/21/20 0212    Specimen:  Urine, Clean Catch Updated:  01/21/20 0231     Color, UA Yellow     Appearance, UA Clear     pH, UA 6.0     Specific Gravity, UA 1.009     Glucose, UA Negative     Ketones, UA Negative     Bilirubin, UA Negative     Blood, UA Negative     Protein, UA Negative     Leuk Esterase, UA Negative     Nitrite, UA Negative     Urobilinogen, UA 0.2 E.U./dL    Narrative:       Urine microscopic not indicated.    Urine Culture - Urine, Urine, Clean Catch [[id number]] Collected:  01/21/20 0212    Specimen:  Urine, Clean Catch Updated:  01/21/20 0231    MRSA  Screen, PCR - Swab, Nares [[id number]] Updated:  01/21/20 0148    Specimen:  Swab from Nares         No results found for: HGBA1C  Results from last 7 days   Lab Units 01/21/20  0344   INR  0.87*           No results found for: LIPASE  No results found for: CHOL, CHLPL, TRIG, HDL, LDL, LDLDIRECT    No results found for: INTRAOP, PREDX, FINALDX, COMDX    Microbiology Results (last 10 days)     ** No results found for the last 240 hours. **          ECG/EMG Results (most recent)     Procedure Component Value Units Date/Time    ECG 12 Lead [[id number]] Collected:  01/19/20 0920     Updated:  01/19/20 1407    Narrative:       HEART RATE= 94  bpm  RR Interval= 640  ms  OK Interval= 144  ms  P Horizontal Axis= -53  deg  P Front Axis= 54  deg  QRSD Interval= 74  ms  QT Interval= 361  ms  QRS Axis= 52  deg  T Wave Axis= 64  deg  - ABNORMAL ECG -  Sinus rhythm  Anterior infarct, age indeterminate  No previous ECG available for comparison  Electronically Signed By: Avtar Vo (Tony) 19-Jan-2020 14:07:27  Date and Time of Study: 2020-01-19 09:20:49    Adult Transthoracic Echo Complete W/ Cont if Necessary Per Protocol [[id number]] Collected:  01/20/20 1256     Updated:  01/20/20 1409     BSA 1.9 m^2      BH CV ECHO NANDO - RVDD 2.8 cm      IVSd 1.1 cm      LVIDd 4.5 cm      LVIDs 3.1 cm      LVPWd 1.3 cm      IVS/LVPW 0.89     FS 29.6 %      EDV(Teich) 90.7 ml      ESV(Teich) 39.3 ml      EF(Teich) 56.7 %      EDV(cubed) 88.9 ml      ESV(cubed) 31.1 ml      EF(cubed) 65.0 %      LV mass(C)d 192.9 grams      LV mass(C)dI 101.6 grams/m^2      SV(Teich) 51.4 ml      SI(Teich) 27.1 ml/m^2      SV(cubed) 57.8 ml      SI(cubed) 30.5 ml/m^2      Ao root diam 3.0 cm      Ao root area 7.2 cm^2      ACS 2.2 cm      asc Aorta Diam 3.0 cm      LVOT diam 2.5 cm      LVOT area 4.8 cm^2      RVOT diam 2.0 cm      RVOT area 3.3 cm^2      EDV(MOD-sp4) 60.7 ml      ESV(MOD-sp4) 21.8 ml      EF(MOD-sp4) 64.0 %      EDV(MOD-sp2) 72.3 ml       ESV(MOD-sp2) 27.4 ml      EF(MOD-sp2) 62.1 %      SV(MOD-sp4) 38.9 ml      SI(MOD-sp4) 20.5 ml/m^2      SV(MOD-sp2) 44.9 ml      SI(MOD-sp2) 23.6 ml/m^2      Ao root area (BSA corrected) 1.6     LV Brown Vol (BSA corrected) 32.0 ml/m^2      LV Sys Vol (BSA corrected) 11.5 ml/m^2      Aortic R-R 0.65 sec      Aortic HR 92.2 BPM      MV E max trey 58.4 cm/sec      MV A max trey 89.0 cm/sec      MV E/A 0.66     MV V2 max 78.1 cm/sec      MV max PG 2.4 mmHg      MV V2 mean 56.6 cm/sec      MV mean PG 1.4 mmHg      MV V2 VTI 17.5 cm      MVA(VTI) 4.7 cm^2      MV dec slope 160.1 cm/sec^2      MV dec time 0.36 sec      Ao pk trey 122.5 cm/sec      Ao max PG 6.0 mmHg      Ao max PG (full) 1.6 mmHg      Ao V2 mean 92.0 cm/sec      Ao mean PG 3.6 mmHg      Ao mean PG (full) 1.7 mmHg      Ao V2 VTI 19.6 cm      SHELIA(I,A) 4.2 cm^2      SHELIA(I,D) 4.2 cm^2      SHELIA(V,A) 4.1 cm^2      SHELIA(V,D) 4.1 cm^2      LV V1 max PG 4.4 mmHg      LV V1 mean PG 2.0 mmHg      LV V1 max 104.8 cm/sec      LV V1 mean 64.5 cm/sec      LV V1 VTI 17.2 cm      CO(Ao) 12.9 l/min      CI(Ao) 6.8 l/min/m^2      SV(Ao) 140.5 ml      SI(Ao) 74.0 ml/m^2      CO(LVOT) 7.6 l/min      CI(LVOT) 4.0 l/min/m^2      SV(LVOT) 82.5 ml      SV(RVOT) 48.2 ml      SI(LVOT) 43.5 ml/m^2      PA V2 max 96.1 cm/sec      PA max PG 3.7 mmHg      PA max PG (full) 0.8 mmHg      PA V2 mean 76.9 cm/sec      PA mean PG 2.5 mmHg      PA mean PG (full) 0.87 mmHg      PA V2 VTI 18.4 cm      PVA(I,A) 2.6 cm^2       CV ECHO NANDO - PVA(I,D) 2.6 cm^2       CV ECHO NANDO - PVA(V,A) 2.9 cm^2       CV ECHO NANDO - PVA(V,D) 2.9 cm^2      PA acc time 0.11 sec      RV V1 max PG 2.9 mmHg      RV V1 mean PG 1.6 mmHg      RV V1 max 85.1 cm/sec      RV V1 mean 58.7 cm/sec      RV V1 VTI 14.7 cm      TR max trey 242.3 cm/sec      RVSP(TR) 31.5 mmHg      RAP systole 8.0 mmHg      PA pr(Accel) 31.0 mmHg      Qp/Qs 0.58      CV ECHO NANDO - BZI_BMI 25.5 kilograms/m^2       CV ECHO NANDO - BSA([facility])  1.9 m^2       CV ECHO NANDO - BZI_METRIC_WEIGHT 76.2 kg      BH CV ECHO NANDO - BZI_METRIC_HEIGHT 172.7 cm      EF(MOD-bp) 61.0 %      LA dimension(2D) 2.7 cm                     Xr Chest 1 View    Result Date: 1/19/2020   1. Normal portable chest 2. I have no comparison  Electronically Signed By-Greg Becerril Jr. On:1/19/2020 10:18 AM This report was finalized on [id number] by  Greg Becerril Jr., .          Xrays, labs reviewed personally by physician.    Medication Review:   I have reviewed the patient's current medication list      Scheduled Meds    DULoxetine 60 mg Oral Daily   heparin (porcine) 5,000 Units Subcutaneous Q12H   hydroCHLOROthiazide 25 mg Oral Daily   pantoprazole 40 mg Oral QAM   sodium chloride 10 mL Intravenous Q12H   tamsulosin 0.8 mg Oral Daily       Meds Infusions       Meds PRN  sodium chloride    I personally reviewed patient's x-ray films   Assessment/Plan   Assessment/Plan     Active Hospital Problems    Diagnosis  POA   • **Chest pain [R07.9]  Yes   • Essential hypertension [I10]  Unknown   • GERD without esophagitis [K21.9]  Unknown   • BPH without obstruction/lower urinary tract symptoms [N40.0]  Unknown   • Angina at rest (CMS/HCC) [I20.8]  Unknown   • Abnormal nuclear stress test [R94.39]  Unknown      Resolved Hospital Problems   No resolved problems to display.       MEDICAL DECISION MAKING COMPLEXITY BY PROBLEM:     Chest pain  Rule out acute coronary syndrome  Serial troponin insignificant  EKG showed no acute ST/T wave changes  Myoview stress test  1/20- abnormal  Cardiologist following  Scheduled for cardiac catheterization later today    Hypertension-blood pressure controlled  On hydrochlorothiazide    GERD-PPI      Hypokalemia- on replacement protocol     depression-on Cymbalta    BPH-on Flomax      DVT prophylaxis with heparin subcut          Code Status -   Code Status and Medical Interventions:   Ordered at: 01/19/20 1255     Level Of Support Discussed With:    Patient      Code Status:    CPR     Medical Interventions (Level of Support Prior to Arrest):    Full       Discharge Planning          Destination      Coordination has not been started for this encounter.      Durable Medical Equipment      Coordination has not been started for this encounter.      Dialysis/Infusion      Coordination has not been started for this encounter.      Home Medical Care      Coordination has not been started for this encounter.      Therapy      Coordination has not been started for this encounter.      Community Resources      Coordination has not been started for this encounter.            Electronically signed by Dong Johansen MD, 01/21/20, 9:19 AM.  [facility] Hospitalist Team

## 2020-01-21 NOTE — PLAN OF CARE
63 yo male, admitting dx of chest pain.  Pt reporting mild chest pain to left lateral side this shift, vitals stable wnl.  Pt scheduled for cardiac catheterization procedure later on today, consent form signed, procedure explained to patient.  Pt NPO except for Ice chips after midnight.  No acute changes this shift.  Will continue to monitor and observe.    Problem: Patient Care Overview  Goal: Plan of Care Review  Outcome: Ongoing (interventions implemented as appropriate)  Flowsheets (Taken 1/21/2020 3270)  Progress: no change  Plan of Care Reviewed With: patient  Goal: Individualization and Mutuality  Outcome: Ongoing (interventions implemented as appropriate)  Goal: Discharge Needs Assessment  Outcome: Ongoing (interventions implemented as appropriate)  Goal: Interprofessional Rounds/Family Conf  Outcome: Ongoing (interventions implemented as appropriate)     Problem: Cardiac: ACS (Acute Coronary Syndrome) (Adult)  Goal: Signs and Symptoms of Listed Potential Problems Will be Absent, Minimized or Managed (Cardiac: ACS)  Outcome: Ongoing (interventions implemented as appropriate)     Problem: Alcohol Withdrawal Acute, Risk/Actual (Adult)  Goal: Signs and Symptoms of Listed Potential Problems Will be Absent, Minimized or Managed (Alcohol Withdrawal Acute, Risk/Actual)  Outcome: Ongoing (interventions implemented as appropriate)

## 2020-01-22 VITALS
BODY MASS INDEX: 25.13 KG/M2 | OXYGEN SATURATION: 95 % | DIASTOLIC BLOOD PRESSURE: 82 MMHG | SYSTOLIC BLOOD PRESSURE: 124 MMHG | RESPIRATION RATE: 16 BRPM | WEIGHT: 165.79 LBS | TEMPERATURE: 97.9 F | HEIGHT: 68 IN | HEART RATE: 78 BPM

## 2020-01-22 LAB
ANION GAP SERPL CALCULATED.3IONS-SCNC: 9 MMOL/L (ref 5–15)
BACTERIA SPEC AEROBE CULT: NORMAL
BUN BLD-MCNC: 11 MG/DL (ref 8–23)
BUN/CREAT SERPL: 13.6 (ref 7–25)
CALCIUM SPEC-SCNC: 9.6 MG/DL (ref 8.6–10.5)
CHLORIDE SERPL-SCNC: 102 MMOL/L (ref 98–107)
CO2 SERPL-SCNC: 26 MMOL/L (ref 22–29)
CREAT BLD-MCNC: 0.81 MG/DL (ref 0.76–1.27)
GFR SERPL CREATININE-BSD FRML MDRD: 96 ML/MIN/1.73
GLUCOSE BLD-MCNC: 120 MG/DL (ref 65–99)
POTASSIUM BLD-SCNC: 4 MMOL/L (ref 3.5–5.2)
SODIUM BLD-SCNC: 137 MMOL/L (ref 136–145)

## 2020-01-22 PROCEDURE — 25010000002 HEPARIN (PORCINE) PER 1000 UNITS: Performed by: INTERNAL MEDICINE

## 2020-01-22 PROCEDURE — 80048 BASIC METABOLIC PNL TOTAL CA: CPT | Performed by: INTERNAL MEDICINE

## 2020-01-22 PROCEDURE — 99238 HOSP IP/OBS DSCHRG MGMT 30/<: CPT | Performed by: INTERNAL MEDICINE

## 2020-01-22 PROCEDURE — 99232 SBSQ HOSP IP/OBS MODERATE 35: CPT | Performed by: INTERNAL MEDICINE

## 2020-01-22 PROCEDURE — 93005 ELECTROCARDIOGRAM TRACING: CPT | Performed by: INTERNAL MEDICINE

## 2020-01-22 RX ADMIN — Medication 10 ML: at 09:21

## 2020-01-22 RX ADMIN — HYDROCHLOROTHIAZIDE 25 MG: 25 TABLET ORAL at 09:20

## 2020-01-22 RX ADMIN — HEPARIN SODIUM 5000 UNITS: 5000 INJECTION INTRAVENOUS; SUBCUTANEOUS at 09:20

## 2020-01-22 RX ADMIN — PANTOPRAZOLE SODIUM 40 MG: 40 TABLET, DELAYED RELEASE ORAL at 06:15

## 2020-01-22 RX ADMIN — DULOXETINE HYDROCHLORIDE 60 MG: 30 CAPSULE, DELAYED RELEASE ORAL at 09:20

## 2020-01-22 RX ADMIN — TAMSULOSIN HYDROCHLORIDE 0.8 MG: 0.4 CAPSULE ORAL at 09:20

## 2020-01-22 NOTE — DISCHARGE SUMMARY
[facility] Medicine Services  DISCHARGE SUMMARY        Prepared For PCP:  Sancho Dover    Patient Name: Rodolfo Regan  : 1955  MRN: [id number]      Date of Admission:   2020    Date of Discharge:  2020    Length of stay:  LOS: 1 day     Hospital Course     Presenting Problem:   Acute alcohol intoxication delirium with moderate or severe use disorder (CMS/HCC) [F10.221]  Chest pain, unspecified type [R07.9]  Chest pain, unspecified type [R07.9]      Active Hospital Problems    Diagnosis  POA   • **Chest pain [R07.9]  Yes   • Essential hypertension [I10]  Unknown   • GERD without esophagitis [K21.9]  Unknown   • BPH without obstruction/lower urinary tract symptoms [N40.0]  Unknown   • Angina at rest (CMS/HCC) [I20.8]  Unknown   • Abnormal nuclear stress test [R94.39]  Unknown      Resolved Hospital Problems   No resolved problems to display.           Hospital Course:  64-year-old man with medical history significant for hypertension, GERD and BPH.  Presented to the ED with complaints of chest pain which is retrosternal, intermittent, nonradiating with no known aggravating factor but relief with sublingual nitro.  After evaluated in the ED, patient was admitted for further care.    Conditions addressed while inpatient are as stated below    Chest pain  Ruled out acute coronary syndrome with EKG and enzymes  Myoview stress test  - abnormal  Cardiologist followed while inpatient    cardiac catheterization -none obstructive CAD with normal LV function   Medical management advised   Patient to follow-up with a cardiologist as outpatient       hypertension-blood pressure controlled  On hydrochlorothiazide     GERD-PPI        Hypokalemia-  replaced    depression-on Cymbalta     BPH-on Flomax          Recommendation for Outpatient Providers:             Reasons For Change In Medications and Indications for New Medications:        Day of Discharge     HPI:       Vital  Signs:   Temp:  [97.9 °F (36.6 °C)-98.1 °F (36.7 °C)] 97.9 °F (36.6 °C)  Heart Rate:  [71-91] 78  Resp:  [16-18] 16  BP: (109-134)/(71-91) 124/82     Physical Exam:  Physical Exam   Constitutional: He is oriented to person, place, and time. He appears well-developed. No distress.   HENT:   Head: Normocephalic and atraumatic.   Eyes: Pupils are equal, round, and reactive to light. EOM are normal.   Cardiovascular: Normal rate and regular rhythm.   Pulmonary/Chest: Breath sounds normal. No respiratory distress.   Abdominal: Soft. Bowel sounds are normal.   Musculoskeletal: Normal range of motion.   Neurological: He is alert and oriented to person, place, and time.   Skin: Skin is warm and dry.   Psychiatric: He has a normal mood and affect.   Nursing note and vitals reviewed.      Pertinent  and/or Most Recent Results     Results from last 7 days   Lab Units 01/22/20  0644 01/21/20  0344 01/19/20  0920   WBC 10*3/mm3  --  6.80 9.80   HEMOGLOBIN g/dL  --  13.3 15.6   HEMATOCRIT %  --  37.0* 43.9   PLATELETS 10*3/mm3  --  222 306   SODIUM mmol/L 137 138 144   POTASSIUM mmol/L 4.0 3.4* 3.2*   CHLORIDE mmol/L 102 99 101   CO2 mmol/L 26.0 24.0 23.0   BUN mg/dL 11 11 22   CREATININE mg/dL 0.81 0.68* 1.06   GLUCOSE mg/dL 120* 104* 66   CALCIUM mg/dL 9.6 9.6 9.7     Results from last 7 days   Lab Units 01/21/20  0344 01/19/20  0920   BILIRUBIN mg/dL 0.6 0.4   ALK PHOS U/L 73 87   ALT (SGPT) U/L 5 7   AST (SGOT) U/L 17 22   PROTIME Seconds 9.4*  --    INR  0.87*  --            Invalid input(s): TG, LDLCALC, LDLREALC  Results from last 7 days   Lab Units 01/20/20  0424 01/19/20  2141 01/19/20  1639 01/19/20  0920   TROPONIN T ng/mL <0.010 <0.010 <0.010 <0.010       Brief Urine Lab Results  (Last result in the past 365 days)      Color   Clarity   Blood   Leuk Est   Nitrite   Protein   CREAT   Urine HCG        01/21/20 0212 Yellow Clear Negative Negative Negative Negative               Microbiology Results Abnormal     Procedure  Component Value - Date/Time    Urine Culture - Urine, Urine, Clean Catch [[id number]]  (Normal) Collected:  01/21/20 0212    Lab Status:  Final result Specimen:  Urine, Clean Catch Updated:  01/22/20 1158     Urine Culture No growth at 24 hours          Xr Chest 1 View    Result Date: 1/19/2020  Impression:  1. Normal portable chest 2. I have no comparison  Electronically Signed By-Greg Becerril Jr. On:1/19/2020 10:18 AM This report was finalized on [id number] by  Greg Becerril Jr., .                Results for orders placed during the hospital encounter of 01/19/20   Adult Transthoracic Echo Complete W/ Cont if Necessary Per Protocol    Narrative · Left ventricular systolic function is normal.  · There is calcification of the aortic valve.  · Mild mitral valve regurgitation is present  · Mild tricuspid valve regurgitation is present.  · LV ejection fraction is about 60 to 65%  · No pericardial effusion noted                  Test Results Pending at Discharge        Procedures Performed  Procedure(s):  Left Heart Cath and coronary angiogram  Left ventriculography         Consults:   Consults     No orders found from 12/21/2019 to 1/20/2020.            Discharge Details        Discharge Medications      Continue These Medications      Instructions Start Date   CBD oil capsule  Commonly known as:  cannabidiol   1 capsule, Oral, Daily      coenzyme Q10 100 MG capsule   100 mg, Oral, Daily      DULoxetine 60 MG capsule  Commonly known as:  CYMBALTA   60 mg, Oral, Daily      hydroCHLOROthiazide 25 MG tablet  Commonly known as:  HYDRODIURIL   25 mg, Oral, Daily      omeprazole 20 MG capsule  Commonly known as:  priLOSEC   20 mg, Oral, Daily      tamsulosin 0.4 MG capsule 24 hr capsule  Commonly known as:  FLOMAX   2 capsules, Oral, Daily         Stop These Medications    CENTRUM ADULTS PO     diphenhydrAMINE-acetaminophen  MG tablet per tablet  Commonly known as:  TYLENOL PM     NON FORMULARY            No Known  Allergies      Discharge Disposition:  Home or Self Care    Diet:  Hospital:  Diet Order   Procedures   • Diet Cardiac; Healthy Heart         Discharge Activity:   Activity Instructions     Activity as Tolerated              CODE STATUS:    Code Status and Medical Interventions:   Ordered at: 01/19/20 1255     Level Of Support Discussed With:    Patient     Code Status:    CPR     Medical Interventions (Level of Support Prior to Arrest):    Full         Follow-up Appointments  No future appointments.    Additional Instructions for the Follow-ups that You Need to Schedule     Discharge Follow-up with PCP   As directed       Currently Documented PCP:    Sancho Dover    PCP Phone Number:    [phone]     Follow Up Details:  Follow-up with PCP as needed         Discharge Follow-up with Specified Provider: Follow-up with a cardiologist in 1 month   As directed      To:  Follow-up with a cardiologist in 1 month                 Condition on Discharge:      Stable          Electronically signed by Dong Johansen MD, 01/22/20, 9:27 AM.    Time: I spent  30  minutes on this discharge activity which included face-to-face encounter with the patient/reviewing the data in the system/coordination of the care with the nursing staff as well as consultants/documentation/entering orders.

## 2020-01-22 NOTE — PROGRESS NOTES
"Referring Provider: Hospitalist    Reason for follow-up: Chest pain, abnormal Myoview     Patient Care Team:  Sancho Dover as PCP - General (Internal Medicine)    Subjective .  No chest pain or shortness of breath    Objective  Lying in bed comfortably     Review of Systems   Constitution: Negative for fever and malaise/fatigue.   Cardiovascular: Negative for chest pain, dyspnea on exertion and palpitations.   Respiratory: Negative for cough and shortness of breath.    Skin: Negative for rash.   Gastrointestinal: Negative for abdominal pain, nausea and vomiting.   Neurological: Negative for focal weakness and headaches.   All other systems reviewed and are negative.      Patient has no known allergies.    Scheduled Meds:    DULoxetine 60 mg Oral Daily   heparin (porcine) 5,000 Units Subcutaneous Q12H   hydroCHLOROthiazide 25 mg Oral Daily   pantoprazole 40 mg Oral QAM   sodium chloride 10 mL Intravenous Q12H   tamsulosin 0.8 mg Oral Daily     Continuous Infusions:    sodium chloride 75 mL/hr Last Rate: 75 mL/hr (01/21/20 2305)     PRN Meds:.•  atropine  •  potassium chloride  •  potassium chloride  •  sodium chloride  •  sodium chloride        VITAL SIGNS  Vitals:    01/21/20 1915 01/21/20 2020 01/22/20 0620 01/22/20 1256   BP:  109/80 134/87 124/82   BP Location:   Right arm Left arm   Patient Position:   Lying Sitting   Pulse: 77 91 73 78   Resp:  16 18 16   Temp:   98.1 °F (36.7 °C) 97.9 °F (36.6 °C)   TempSrc:   Oral Axillary   SpO2: 97% 90% 97% 95%   Weight:       Height:           Flowsheet Rows      First Filed Value   Admission Height  172.7 cm (68\") Documented at 01/19/2020 0916   Admission Weight  77.1 kg (170 lb) Documented at 01/19/2020 0916           TELEMETRY: Normal sinus rhythm    Physical Exam:  Physical Exam   Constitutional: He appears well-developed and well-nourished.   HENT:   Head: Normocephalic and atraumatic.   Eyes: Conjunctivae are normal. No scleral icterus.   Neck: Normal range of " motion. Neck supple. No JVD present. Carotid bruit is not present.   Cardiovascular: Normal rate, regular rhythm, S1 normal, S2 normal, normal heart sounds and intact distal pulses. PMI is not displaced.   Pulmonary/Chest: Effort normal and breath sounds normal. He has no wheezes. He has no rales.   Abdominal: Soft. Bowel sounds are normal.   Neurological: He is alert. He has normal strength.   Skin: Skin is warm and dry. No rash noted.        Results Review:   I reviewed the patient's new clinical results.  Lab Results (last 24 hours)     Procedure Component Value Units Date/Time    Urine Culture - Urine, Urine, Clean Catch [[id number]]  (Normal) Collected:  01/21/20 0212    Specimen:  Urine, Clean Catch Updated:  01/22/20 1158     Urine Culture No growth at 24 hours    Basic Metabolic Panel [[id number]]  (Abnormal) Collected:  01/22/20 0644    Specimen:  Blood Updated:  01/22/20 0746     Glucose 120 mg/dL      BUN 11 mg/dL      Creatinine 0.81 mg/dL      Sodium 137 mmol/L      Potassium 4.0 mmol/L      Chloride 102 mmol/L      CO2 26.0 mmol/L      Calcium 9.6 mg/dL      eGFR Non African Amer 96 mL/min/1.73      BUN/Creatinine Ratio 13.6     Anion Gap 9.0 mmol/L     Narrative:       GFR Normal >60  Chronic Kidney Disease <60  Kidney Failure <15            Imaging Results (Last 24 Hours)     ** No results found for the last 24 hours. **          EKG      I personally viewed and interpreted the patient's EKG/Telemetry data:    ECHOCARDIOGRAM:    STRESS MYOVIEW:    CARDIAC CATHETERIZATION:    OTHER:         Assessment/Plan     #1 chest pain with abnormal Myoview study  2.  Hypertension  3.  Gastroesophageal fixed disease  4.  BPH     Patient is ruled out for MI by EKG and enzymes  Patient had a cardiac catheterization today which showed nonobstructive coronary artery disease with normal LV function  Patient's blood pressure and heart rate stable  Continue current medical therapy and follow him as an outpatient  Follow  as an outpatient  I discussed the patients findings and my recommendations with patient and nurse    Yannick Paige MD  01/22/20  2:29 PM

## 2020-01-22 NOTE — PLAN OF CARE
65 yo male, admitting dx of Chest pain.  Pt had cardiac catheterization procedure earlier today, 30 percent blockage noted, MD advising to treat medically.  RN educated patient and sibling on proper heart healthy, low sodium, low cholesterol diet.  Pt denies any complaints of chest pain this shift.  Vital signs stable WNL post cardiac catheterization procedure.  Insertion site on right femoral artery clean, dry, and intact with no bleeding.  Dorsal pedis pulse to right foot 1 plus and weak with no s/s or reports of numbness or tingling to right foot.  No s/s of discoloration or circulatory compromise to right foot.  Pt resting well in bed, will continue to monitor and observe.    Problem: Patient Care Overview  Goal: Plan of Care Review  Outcome: Ongoing (interventions implemented as appropriate)  Flowsheets (Taken 1/22/2020 0406)  Progress: no change  Plan of Care Reviewed With: patient;sibling  Goal: Individualization and Mutuality  Outcome: Ongoing (interventions implemented as appropriate)  Goal: Discharge Needs Assessment  Outcome: Ongoing (interventions implemented as appropriate)  Goal: Interprofessional Rounds/Family Conf  Outcome: Ongoing (interventions implemented as appropriate)     Problem: Cardiac: ACS (Acute Coronary Syndrome) (Adult)  Goal: Signs and Symptoms of Listed Potential Problems Will be Absent, Minimized or Managed (Cardiac: ACS)  Outcome: Ongoing (interventions implemented as appropriate)     Problem: Alcohol Withdrawal Acute, Risk/Actual (Adult)  Goal: Signs and Symptoms of Listed Potential Problems Will be Absent, Minimized or Managed (Alcohol Withdrawal Acute, Risk/Actual)  Outcome: Ongoing (interventions implemented as appropriate)

## 2020-01-22 NOTE — PROGRESS NOTES
Discharge Planning Assessment  FATOUMATA Cuellar     Patient Name: Rodolfo Regan  MRN: [id number]  Today's Date: 1/22/2020    Admit Date: 1/19/2020            Patient Forms    Important Message from Medicare ([facility])  Delivered    Delivered to  Patient    Method of delivery  In person            TALKED TO PATIENT REGARDING PRIMARY MD; [facility] [address];  BOOKLET GIVEN TO PATIENT REGARDING PRIMARY MD WITH [facility] ;  ENCOURAGED PATIENT TO FOLLOW WITH [facility] AT [facility]CHARGED    Carol naegele rn  Case management  Office number [phone]  Cell phone [phone]

## 2020-01-22 NOTE — CONSULTS
Not candidate for insurance covered cardiac rehab phase II. If interested in phase III self pay then please contact. ROXANNE Marcial

## 2020-01-23 ENCOUNTER — READMISSION MANAGEMENT (OUTPATIENT)
Dept: CALL CENTER | Facility: HOSPITAL | Age: 65
End: 2020-01-23

## 2020-01-23 NOTE — OUTREACH NOTE
Prep Survey      Responses   Facility patient discharged from?  [facility]   Is patient eligible?  Yes   Discharge diagnosis  ACute alcohol intoxication delirium with mod. or severe use disorder, chest pain, HTN, GERD, BPH,    Does the patient have one of the following disease processes/diagnoses(primary or secondary)?  Other   Does the patient have Home health ordered?  No   Is there a DME ordered?  No   Comments regarding appointments  Pt to schedule follow up appointments   Prep survey completed?  Yes          Desirae Ruiz RN

## 2020-01-23 NOTE — PROGRESS NOTES
Discharge Planning Assessment  [facility]     Patient Name: Rodolfo Regan  MRN: [id number]  Today's Date: 1/23/2020    Admit Date: 1/19/2020    Discharge Needs Assessment    No documentation.       Discharge Plan     Row Name 01/23/20 0723       Plan    Plan Comments  return to home     Final Discharge Disposition Code  01 - home or self-care    Final Note  patient to discharge home     Row Name 01/23/20 0721       Plan    Plan  Patient will discharge to home     Final Discharge Disposition Code  01 - home or self-care    Final Note  Patient to discharge home.                  Expected Discharge Date and Time     Expected Discharge Date Expected Discharge Time    Jan 22, 2020             Urmila [place] [address]   [phone] office  [phone] fax  [email or web address]

## 2020-01-28 ENCOUNTER — READMISSION MANAGEMENT (OUTPATIENT)
Dept: CALL CENTER | Facility: HOSPITAL | Age: 65
End: 2020-01-28

## 2020-01-28 NOTE — OUTREACH NOTE
Medical Week 1 Survey      Responses   Facility patient discharged from?  [facility]   Does the patient have one of the following disease processes/diagnoses(primary or secondary)?  Other   Is there a successful TCM telephone encounter documented?  No   Week 1 attempt successful?  No   Rescheduled  Revoked   Revoke  Decline to participate [NO ANSWER, NO VM]          Silvia Singh LPN

## 2020-04-06 ENCOUNTER — APPOINTMENT (OUTPATIENT)
Dept: GENERAL RADIOLOGY | Facility: HOSPITAL | Age: 65
End: 2020-04-06

## 2020-04-06 ENCOUNTER — HOSPITAL ENCOUNTER (OUTPATIENT)
Facility: HOSPITAL | Age: 65
Setting detail: OBSERVATION
Discharge: HOME | End: 2020-04-06
Attending: EMERGENCY MEDICINE | Admitting: EMERGENCY MEDICINE

## 2020-04-06 ENCOUNTER — READMISSION MANAGEMENT (OUTPATIENT)
Dept: CALL CENTER | Facility: HOSPITAL | Age: 65
End: 2020-04-06

## 2020-04-06 ENCOUNTER — APPOINTMENT (OUTPATIENT)
Dept: CT IMAGING | Facility: HOSPITAL | Age: 65
End: 2020-04-06

## 2020-04-06 VITALS
RESPIRATION RATE: 20 BRPM | TEMPERATURE: 98.4 F | BODY MASS INDEX: 22.49 KG/M2 | OXYGEN SATURATION: 93 % | WEIGHT: 148.4 LBS | HEIGHT: 68 IN | HEART RATE: 92 BPM | SYSTOLIC BLOOD PRESSURE: 155 MMHG | DIASTOLIC BLOOD PRESSURE: 95 MMHG

## 2020-04-06 DIAGNOSIS — Z20.822 SUSPECTED COVID-19 VIRUS INFECTION: ICD-10-CM

## 2020-04-06 DIAGNOSIS — J44.1 COPD EXACERBATION (HCC): Primary | ICD-10-CM

## 2020-04-06 PROBLEM — R63.4 UNINTENTIONAL WEIGHT LOSS: Status: ACTIVE | Noted: 2020-04-06

## 2020-04-06 PROBLEM — Z72.0 TOBACCO ABUSE: Chronic | Status: ACTIVE | Noted: 2020-04-06

## 2020-04-06 PROBLEM — K52.9 CHRONIC DIARRHEA: Chronic | Status: ACTIVE | Noted: 2020-04-06

## 2020-04-06 PROBLEM — R42 DIZZINESS: Status: ACTIVE | Noted: 2020-04-06

## 2020-04-06 PROBLEM — E87.6 HYPOKALEMIA: Status: ACTIVE | Noted: 2020-04-06

## 2020-04-06 PROBLEM — F10.10 ALCOHOL ABUSE: Chronic | Status: ACTIVE | Noted: 2020-04-06

## 2020-04-06 PROBLEM — R55 SYNCOPE: Status: ACTIVE | Noted: 2020-04-06

## 2020-04-06 PROBLEM — R51.9 HEADACHE: Status: ACTIVE | Noted: 2020-04-06

## 2020-04-06 LAB
ALBUMIN SERPL-MCNC: 4.1 G/DL (ref 3.5–5.2)
ALBUMIN/GLOB SERPL: 1.2 G/DL
ALP SERPL-CCNC: 116 U/L (ref 39–117)
ALT SERPL W P-5'-P-CCNC: 11 U/L (ref 1–41)
ANION GAP SERPL CALCULATED.3IONS-SCNC: 18 MMOL/L (ref 5–15)
AST SERPL-CCNC: 48 U/L (ref 1–40)
B PERT DNA SPEC QL NAA+PROBE: NOT DETECTED
BASOPHILS # BLD AUTO: 0 10*3/MM3 (ref 0–0.2)
BASOPHILS NFR BLD AUTO: 0.6 % (ref 0–1.5)
BILIRUB SERPL-MCNC: 0.6 MG/DL (ref 0.2–1.2)
BUN BLD-MCNC: 11 MG/DL (ref 8–23)
BUN/CREAT SERPL: 13.9 (ref 7–25)
C PNEUM DNA NPH QL NAA+NON-PROBE: NOT DETECTED
CALCIUM SPEC-SCNC: 9.5 MG/DL (ref 8.6–10.5)
CHLORIDE SERPL-SCNC: 99 MMOL/L (ref 98–107)
CO2 SERPL-SCNC: 26 MMOL/L (ref 22–29)
CREAT BLD-MCNC: 0.79 MG/DL (ref 0.76–1.27)
CRP SERPL-MCNC: 1.97 MG/DL (ref 0–0.5)
D DIMER PPP FEU-MCNC: 1.15 MCGFEU/ML (ref 0.17–0.59)
DEPRECATED RDW RBC AUTO: 49 FL (ref 37–54)
EOSINOPHIL # BLD AUTO: 0.1 10*3/MM3 (ref 0–0.4)
EOSINOPHIL NFR BLD AUTO: 0.9 % (ref 0.3–6.2)
ERYTHROCYTE [DISTWIDTH] IN BLOOD BY AUTOMATED COUNT: 14.1 % (ref 12.3–15.4)
FLUAV H1 2009 PAND RNA NPH QL NAA+PROBE: NOT DETECTED
FLUAV H1 HA GENE NPH QL NAA+PROBE: NOT DETECTED
FLUAV H3 RNA NPH QL NAA+PROBE: NOT DETECTED
FLUAV SUBTYP SPEC NAA+PROBE: NOT DETECTED
FLUBV RNA ISLT QL NAA+PROBE: NOT DETECTED
GFR SERPL CREATININE-BSD FRML MDRD: 98 ML/MIN/1.73
GLOBULIN UR ELPH-MCNC: 3.5 GM/DL
GLUCOSE BLD-MCNC: 140 MG/DL (ref 65–99)
HADV DNA SPEC NAA+PROBE: NOT DETECTED
HCOV 229E RNA SPEC QL NAA+PROBE: NOT DETECTED
HCOV HKU1 RNA SPEC QL NAA+PROBE: NOT DETECTED
HCOV NL63 RNA SPEC QL NAA+PROBE: NOT DETECTED
HCOV OC43 RNA SPEC QL NAA+PROBE: NOT DETECTED
HCT VFR BLD AUTO: 40.8 % (ref 37.5–51)
HGB BLD-MCNC: 14.5 G/DL (ref 13–17.7)
HMPV RNA NPH QL NAA+NON-PROBE: NOT DETECTED
HOLD SPECIMEN: NORMAL
HOLD SPECIMEN: NORMAL
HPIV1 RNA SPEC QL NAA+PROBE: NOT DETECTED
HPIV2 RNA SPEC QL NAA+PROBE: NOT DETECTED
HPIV3 RNA NPH QL NAA+PROBE: NOT DETECTED
HPIV4 P GENE NPH QL NAA+PROBE: NOT DETECTED
LDH SERPL-CCNC: 199 U/L (ref 135–225)
LYMPHOCYTES # BLD AUTO: 2.2 10*3/MM3 (ref 0.7–3.1)
LYMPHOCYTES NFR BLD AUTO: 27.5 % (ref 19.6–45.3)
M PNEUMO IGG SER IA-ACNC: NOT DETECTED
MCH RBC QN AUTO: 35 PG (ref 26.6–33)
MCHC RBC AUTO-ENTMCNC: 35.7 G/DL (ref 31.5–35.7)
MCV RBC AUTO: 98 FL (ref 79–97)
MONOCYTES # BLD AUTO: 0.8 10*3/MM3 (ref 0.1–0.9)
MONOCYTES NFR BLD AUTO: 10.1 % (ref 5–12)
NEUTROPHILS # BLD AUTO: 4.9 10*3/MM3 (ref 1.7–7)
NEUTROPHILS NFR BLD AUTO: 60.9 % (ref 42.7–76)
NRBC BLD AUTO-RTO: 0.1 /100 WBC (ref 0–0.2)
NT-PROBNP SERPL-MCNC: 157.7 PG/ML (ref 5–900)
PLATELET # BLD AUTO: 206 10*3/MM3 (ref 140–450)
PMV BLD AUTO: 7.3 FL (ref 6–12)
POTASSIUM BLD-SCNC: 3.1 MMOL/L (ref 3.5–5.2)
PROCALCITONIN SERPL-MCNC: 0.05 NG/ML (ref 0.1–0.25)
PROT SERPL-MCNC: 7.6 G/DL (ref 6–8.5)
RBC # BLD AUTO: 4.16 10*6/MM3 (ref 4.14–5.8)
RHINOVIRUS RNA SPEC NAA+PROBE: NOT DETECTED
RSV RNA NPH QL NAA+NON-PROBE: NOT DETECTED
SARS-COV-2 RNA RESP QL NAA+PROBE: NOT DETECTED
SODIUM BLD-SCNC: 143 MMOL/L (ref 136–145)
TROPONIN T SERPL-MCNC: <0.01 NG/ML (ref 0–0.03)
WBC NRBC COR # BLD: 8.1 10*3/MM3 (ref 3.4–10.8)
WHOLE BLOOD HOLD SPECIMEN: NORMAL
WHOLE BLOOD HOLD SPECIMEN: NORMAL

## 2020-04-06 PROCEDURE — 71045 X-RAY EXAM CHEST 1 VIEW: CPT

## 2020-04-06 PROCEDURE — 85379 FIBRIN DEGRADATION QUANT: CPT | Performed by: EMERGENCY MEDICINE

## 2020-04-06 PROCEDURE — G0378 HOSPITAL OBSERVATION PER HR: HCPCS

## 2020-04-06 PROCEDURE — 0 IOPAMIDOL PER 1 ML: Performed by: EMERGENCY MEDICINE

## 2020-04-06 PROCEDURE — 80053 COMPREHEN METABOLIC PANEL: CPT | Performed by: EMERGENCY MEDICINE

## 2020-04-06 PROCEDURE — 84145 PROCALCITONIN (PCT): CPT | Performed by: EMERGENCY MEDICINE

## 2020-04-06 PROCEDURE — 99284 EMERGENCY DEPT VISIT MOD MDM: CPT

## 2020-04-06 PROCEDURE — 96374 THER/PROPH/DIAG INJ IV PUSH: CPT

## 2020-04-06 PROCEDURE — 84484 ASSAY OF TROPONIN QUANT: CPT | Performed by: EMERGENCY MEDICINE

## 2020-04-06 PROCEDURE — 99219 PR INITIAL OBSERVATION CARE/DAY 50 MINUTES: CPT | Performed by: INTERNAL MEDICINE

## 2020-04-06 PROCEDURE — 87635 SARS-COV-2 COVID-19 AMP PRB: CPT | Performed by: EMERGENCY MEDICINE

## 2020-04-06 PROCEDURE — 86140 C-REACTIVE PROTEIN: CPT | Performed by: EMERGENCY MEDICINE

## 2020-04-06 PROCEDURE — 71275 CT ANGIOGRAPHY CHEST: CPT

## 2020-04-06 PROCEDURE — 25010000002 METHYLPREDNISOLONE PER 125 MG: Performed by: EMERGENCY MEDICINE

## 2020-04-06 PROCEDURE — 93005 ELECTROCARDIOGRAM TRACING: CPT | Performed by: EMERGENCY MEDICINE

## 2020-04-06 PROCEDURE — 85025 COMPLETE CBC W/AUTO DIFF WBC: CPT | Performed by: EMERGENCY MEDICINE

## 2020-04-06 PROCEDURE — 83615 LACTATE (LD) (LDH) ENZYME: CPT | Performed by: EMERGENCY MEDICINE

## 2020-04-06 PROCEDURE — 0099U HC BIOFIRE FILMARRAY RESP PANEL 1: CPT | Performed by: EMERGENCY MEDICINE

## 2020-04-06 PROCEDURE — 83880 ASSAY OF NATRIURETIC PEPTIDE: CPT | Performed by: EMERGENCY MEDICINE

## 2020-04-06 PROCEDURE — 70450 CT HEAD/BRAIN W/O DYE: CPT

## 2020-04-06 PROCEDURE — U0003 INFECTIOUS AGENT DETECTION BY NUCLEIC ACID (DNA OR RNA); SEVERE ACUTE RESPIRATORY SYNDROME CORONAVIRUS 2 (SARS-COV-2) (CORONAVIRUS DISEASE [COVID-19]), AMPLIFIED PROBE TECHNIQUE, MAKING USE OF HIGH THROUGHPUT TECHNOLOGIES AS DESCRIBED BY CMS-2020-01-R: HCPCS | Performed by: EMERGENCY MEDICINE

## 2020-04-06 RX ORDER — ACETAMINOPHEN 650 MG/1
650 SUPPOSITORY RECTAL EVERY 4 HOURS PRN
Status: DISCONTINUED | OUTPATIENT
Start: 2020-04-06 | End: 2020-04-06 | Stop reason: HOSPADM

## 2020-04-06 RX ORDER — MULTIPLE VITAMINS W/ MINERALS TAB 2148-113
1 TAB ORAL DAILY
Status: DISCONTINUED | OUTPATIENT
Start: 2020-04-06 | End: 2020-04-06 | Stop reason: HOSPADM

## 2020-04-06 RX ORDER — NICOTINE 21 MG/24HR
1 PATCH, TRANSDERMAL 24 HOURS TRANSDERMAL
Status: DISCONTINUED | OUTPATIENT
Start: 2020-04-06 | End: 2020-04-06 | Stop reason: HOSPADM

## 2020-04-06 RX ORDER — THIAMINE HCL 100 MG
100 TABLET ORAL DAILY
Status: DISCONTINUED | OUTPATIENT
Start: 2020-04-06 | End: 2020-04-06 | Stop reason: HOSPADM

## 2020-04-06 RX ORDER — POTASSIUM CHLORIDE 1.5 G/1.77G
40 POWDER, FOR SOLUTION ORAL AS NEEDED
Status: DISCONTINUED | OUTPATIENT
Start: 2020-04-06 | End: 2020-04-06 | Stop reason: HOSPADM

## 2020-04-06 RX ORDER — SODIUM CHLORIDE 0.9 % (FLUSH) 0.9 %
10 SYRINGE (ML) INJECTION EVERY 12 HOURS SCHEDULED
Status: DISCONTINUED | OUTPATIENT
Start: 2020-04-06 | End: 2020-04-06 | Stop reason: HOSPADM

## 2020-04-06 RX ORDER — LOSARTAN POTASSIUM 50 MG/1
50 TABLET ORAL DAILY
Qty: 30 TABLET | Refills: 0 | Status: SHIPPED | OUTPATIENT
Start: 2020-04-06

## 2020-04-06 RX ORDER — MAGNESIUM SULFATE 1 G/100ML
1 INJECTION INTRAVENOUS AS NEEDED
Status: DISCONTINUED | OUTPATIENT
Start: 2020-04-06 | End: 2020-04-06 | Stop reason: HOSPADM

## 2020-04-06 RX ORDER — ACETAMINOPHEN 325 MG/1
650 TABLET ORAL EVERY 4 HOURS PRN
Status: DISCONTINUED | OUTPATIENT
Start: 2020-04-06 | End: 2020-04-06 | Stop reason: HOSPADM

## 2020-04-06 RX ORDER — BUTALBITAL, ACETAMINOPHEN AND CAFFEINE 50; 325; 40 MG/1; MG/1; MG/1
1 TABLET ORAL EVERY 4 HOURS PRN
Status: DISCONTINUED | OUTPATIENT
Start: 2020-04-06 | End: 2020-04-06 | Stop reason: HOSPADM

## 2020-04-06 RX ORDER — TAMSULOSIN HYDROCHLORIDE 0.4 MG/1
0.8 CAPSULE ORAL DAILY
Status: DISCONTINUED | OUTPATIENT
Start: 2020-04-06 | End: 2020-04-06 | Stop reason: HOSPADM

## 2020-04-06 RX ORDER — ACETAMINOPHEN 160 MG/5ML
650 SOLUTION ORAL EVERY 4 HOURS PRN
Status: DISCONTINUED | OUTPATIENT
Start: 2020-04-06 | End: 2020-04-06 | Stop reason: HOSPADM

## 2020-04-06 RX ORDER — CHOLECALCIFEROL (VITAMIN D3) 125 MCG
5 CAPSULE ORAL NIGHTLY PRN
Qty: 30 TABLET | Refills: 1 | Status: SHIPPED | OUTPATIENT
Start: 2020-04-06

## 2020-04-06 RX ORDER — MAGNESIUM SULFATE HEPTAHYDRATE 40 MG/ML
2 INJECTION, SOLUTION INTRAVENOUS AS NEEDED
Status: DISCONTINUED | OUTPATIENT
Start: 2020-04-06 | End: 2020-04-06 | Stop reason: HOSPADM

## 2020-04-06 RX ORDER — LOPERAMIDE HYDROCHLORIDE 2 MG/1
4 CAPSULE ORAL 4 TIMES DAILY PRN
Status: DISCONTINUED | OUTPATIENT
Start: 2020-04-06 | End: 2020-04-06 | Stop reason: HOSPADM

## 2020-04-06 RX ORDER — BISACODYL 10 MG
10 SUPPOSITORY, RECTAL RECTAL DAILY PRN
Status: DISCONTINUED | OUTPATIENT
Start: 2020-04-06 | End: 2020-04-06 | Stop reason: HOSPADM

## 2020-04-06 RX ORDER — CHOLECALCIFEROL (VITAMIN D3) 125 MCG
5 CAPSULE ORAL NIGHTLY PRN
Status: DISCONTINUED | OUTPATIENT
Start: 2020-04-06 | End: 2020-04-06 | Stop reason: HOSPADM

## 2020-04-06 RX ORDER — DULOXETIN HYDROCHLORIDE 30 MG/1
60 CAPSULE, DELAYED RELEASE ORAL DAILY
Status: DISCONTINUED | OUTPATIENT
Start: 2020-04-06 | End: 2020-04-06 | Stop reason: HOSPADM

## 2020-04-06 RX ORDER — POTASSIUM CHLORIDE 20 MEQ/1
40 TABLET, EXTENDED RELEASE ORAL AS NEEDED
Status: DISCONTINUED | OUTPATIENT
Start: 2020-04-06 | End: 2020-04-06 | Stop reason: HOSPADM

## 2020-04-06 RX ORDER — ALUMINA, MAGNESIA, AND SIMETHICONE 2400; 2400; 240 MG/30ML; MG/30ML; MG/30ML
15 SUSPENSION ORAL EVERY 6 HOURS PRN
Status: DISCONTINUED | OUTPATIENT
Start: 2020-04-06 | End: 2020-04-06 | Stop reason: HOSPADM

## 2020-04-06 RX ORDER — ONDANSETRON 4 MG/1
4 TABLET, FILM COATED ORAL EVERY 6 HOURS PRN
Status: DISCONTINUED | OUTPATIENT
Start: 2020-04-06 | End: 2020-04-06 | Stop reason: HOSPADM

## 2020-04-06 RX ORDER — BUDESONIDE AND FORMOTEROL FUMARATE DIHYDRATE 160; 4.5 UG/1; UG/1
2 AEROSOL RESPIRATORY (INHALATION)
Qty: 1 INHALER | Refills: 12 | Status: SHIPPED | OUTPATIENT
Start: 2020-04-06

## 2020-04-06 RX ORDER — ALBUTEROL SULFATE 90 UG/1
2 AEROSOL, METERED RESPIRATORY (INHALATION) EVERY 4 HOURS PRN
Status: DISCONTINUED | OUTPATIENT
Start: 2020-04-06 | End: 2020-04-06 | Stop reason: HOSPADM

## 2020-04-06 RX ORDER — PANTOPRAZOLE SODIUM 40 MG/1
40 TABLET, DELAYED RELEASE ORAL EVERY MORNING
Status: DISCONTINUED | OUTPATIENT
Start: 2020-04-06 | End: 2020-04-06 | Stop reason: HOSPADM

## 2020-04-06 RX ORDER — BISACODYL 5 MG/1
5 TABLET, DELAYED RELEASE ORAL DAILY PRN
Status: DISCONTINUED | OUTPATIENT
Start: 2020-04-06 | End: 2020-04-06 | Stop reason: HOSPADM

## 2020-04-06 RX ORDER — POTASSIUM CHLORIDE 20 MEQ/1
20 TABLET, EXTENDED RELEASE ORAL DAILY
Qty: 14 TABLET | Refills: 0 | Status: SHIPPED | OUTPATIENT
Start: 2020-04-07

## 2020-04-06 RX ORDER — POTASSIUM CHLORIDE 20 MEQ/1
20 TABLET, EXTENDED RELEASE ORAL DAILY
Status: DISCONTINUED | OUTPATIENT
Start: 2020-04-06 | End: 2020-04-06 | Stop reason: HOSPADM

## 2020-04-06 RX ORDER — PREDNISONE 10 MG/1
TABLET ORAL
Qty: 30 TABLET | Refills: 0 | Status: SHIPPED | OUTPATIENT
Start: 2020-04-06

## 2020-04-06 RX ORDER — FOLIC ACID 1 MG/1
1 TABLET ORAL DAILY
Qty: 30 TABLET | Refills: 2 | Status: SHIPPED | OUTPATIENT
Start: 2020-04-07

## 2020-04-06 RX ORDER — POTASSIUM CHLORIDE 1.5 G/1.77G
20 POWDER, FOR SOLUTION ORAL 2 TIMES DAILY
Status: DISCONTINUED | OUTPATIENT
Start: 2020-04-06 | End: 2020-04-06

## 2020-04-06 RX ORDER — ACETAMINOPHEN 500 MG
1000 TABLET ORAL ONCE
Status: COMPLETED | OUTPATIENT
Start: 2020-04-06 | End: 2020-04-06

## 2020-04-06 RX ORDER — ONDANSETRON 2 MG/ML
4 INJECTION INTRAMUSCULAR; INTRAVENOUS EVERY 6 HOURS PRN
Status: DISCONTINUED | OUTPATIENT
Start: 2020-04-06 | End: 2020-04-06 | Stop reason: HOSPADM

## 2020-04-06 RX ORDER — SODIUM CHLORIDE 0.9 % (FLUSH) 0.9 %
10 SYRINGE (ML) INJECTION AS NEEDED
Status: DISCONTINUED | OUTPATIENT
Start: 2020-04-06 | End: 2020-04-06 | Stop reason: HOSPADM

## 2020-04-06 RX ORDER — FOLIC ACID 1 MG/1
1 TABLET ORAL DAILY
Status: DISCONTINUED | OUTPATIENT
Start: 2020-04-06 | End: 2020-04-06 | Stop reason: HOSPADM

## 2020-04-06 RX ORDER — LANOLIN ALCOHOL/MO/W.PET/CERES
100 CREAM (GRAM) TOPICAL DAILY
Qty: 30 TABLET | Refills: 1 | Status: SHIPPED | OUTPATIENT
Start: 2020-04-07

## 2020-04-06 RX ORDER — METHYLPREDNISOLONE SODIUM SUCCINATE 125 MG/2ML
125 INJECTION, POWDER, LYOPHILIZED, FOR SOLUTION INTRAMUSCULAR; INTRAVENOUS ONCE
Status: COMPLETED | OUTPATIENT
Start: 2020-04-06 | End: 2020-04-06

## 2020-04-06 RX ADMIN — Medication 100 MG: at 11:43

## 2020-04-06 RX ADMIN — TAMSULOSIN HYDROCHLORIDE 0.8 MG: 0.4 CAPSULE ORAL at 09:49

## 2020-04-06 RX ADMIN — POTASSIUM CHLORIDE 20 MEQ: 1500 TABLET, EXTENDED RELEASE ORAL at 06:47

## 2020-04-06 RX ADMIN — NICOTINE 1 PATCH: 21 PATCH TRANSDERMAL at 12:16

## 2020-04-06 RX ADMIN — DULOXETINE HYDROCHLORIDE 60 MG: 30 CAPSULE, DELAYED RELEASE ORAL at 09:49

## 2020-04-06 RX ADMIN — Medication 10 ML: at 08:33

## 2020-04-06 RX ADMIN — IOPAMIDOL 57 ML: 755 INJECTION, SOLUTION INTRAVENOUS at 05:19

## 2020-04-06 RX ADMIN — PANTOPRAZOLE SODIUM 40 MG: 40 TABLET, DELAYED RELEASE ORAL at 09:49

## 2020-04-06 RX ADMIN — FOLIC ACID 1 MG: 1 TABLET ORAL at 09:49

## 2020-04-06 RX ADMIN — METHYLPREDNISOLONE SODIUM SUCCINATE 125 MG: 125 INJECTION, POWDER, FOR SOLUTION INTRAMUSCULAR; INTRAVENOUS at 06:14

## 2020-04-06 RX ADMIN — MULTIPLE VITAMINS W/ MINERALS TAB 1 TABLET: TAB at 09:50

## 2020-04-06 RX ADMIN — ACETAMINOPHEN 1000 MG: 500 TABLET, FILM COATED ORAL at 05:06

## 2020-04-06 NOTE — DISCHARGE SUMMARY
[facility] Medicine Services  DISCHARGE SUMMARY        Prepared For PCP:  Sancho Dover    Patient Name: Rodolfo Regan  : 1955  MRN: [id number]      Date of Admission:   2020    Date of Discharge:  2020    Length of stay:  LOS: 0 days     Hospital Course     Presenting Problem:   COPD exacerbation (CMS/HCC) [J44.1]  Suspected Covid-19 Virus Infection [R68.89]      Active Hospital Problems    Diagnosis  POA   • **Syncope [R55]  Yes     Priority: High   • COPD exacerbation (CMS/HCC) [J44.1]  Yes   • Suspected Covid-19 Virus Infection [R68.89]  Unknown   • Chronic diarrhea [K52.9]  Yes   • Alcohol abuse [F10.10]  Yes   • Tobacco abuse [Z72.0]  Yes   • Unintentional weight loss [R63.4]  Yes   • Headache [R51]  Yes   • Dizziness [R42]  Yes   • Hypokalemia [E87.6]  Yes   • Essential hypertension [I10]  Yes   • GERD without esophagitis [K21.9]  Yes   • BPH without obstruction/lower urinary tract symptoms [N40.0]  Yes      Resolved Hospital Problems   No resolved problems to display.     Syncope, headache, dizziness-chronic insomnia-advised not to use tylenol pm  -CT head showed no acute intracranial abnormality.   -EKG showed sinus tachycardia  -check orthostatic vitals signs, hold HCTZ, [facility], continue bedside cardiac monitor, prn fioricet, consider neurology evaluation      Dyspnea, possible COPD exacerbation, neg for Covid-19  -CT PE protocol showed no acute cardiopulmonary abnormality. Centrilobular emphysema.  -CRP 1.97, procalcitonin 0.05, LDH normal 199, d-dimer 1.15, troponin normal, WBC normal and lymphocytes normal. RVP negative.  -given one dose of IV solumedrol in ER, will hold off on further steroids for now  -symbicort bid, prn combivent inhaler-prednisone taper  -Covid-19 rule out neg     Hypokalemia  -K 3.1  -replacement protocol, check Mg and replace as needed. Pt's leg cramps likely from electrolyte imbalance     Unintentional  weight loss  -BMP 22.56  -needs further outpatient workup     Hypertension  -controlled. Hold HCTZ due to hypokalemia, leg cramps, and syncope  -add losartan     Nonobstructive CAD  -cardiac catheterization 1/21/20 non obstructive CAD with normal LV function     Chronic diarrhea  -prn imodium     GERD  -cont home PPI     BPH  -cont home flomax     Chronic alcohol abuse  -drinks 1 liter every 1-2 weeks  -MVI, folic acid, thiamine     Tobacco abuse  -nicotine patch prn  -encourage cessation     Hospital Course:  Rodolfo Regan is a 64 y.o. male with PMH of HTN, nonobstructive CAD, BPH, chronic diarrhea, GERD, tobacco abuse, and alcohol abuse who presented to [facility] ER 4/6/20 with complaints of right sided headache, dizziness, and syncope. At home last night he stood up from his chair, became dizzy and saw black, and then passed out. His brother brought him to the ER. He stated he has been weak and dizzy for quite some time. His dizziness mostly occurs when he goes from a sitting to standing position. He denied any lateralizing weakness or numbness or tingling. He stated his headache is right sided and is sharp, stabbing pain. The headache has been ongoing for nearly a year. He believes he has had a MRI brain at the [facility]. He has stopped drinking caffeine per his PCPs direction. Some of his other symptoms include shortness of breath and chest pain intermittently since Nov 2019. He has had chills and sweats intermittently but these symptoms are not new and are ongoing. He denied any nausea or vomiting. He has chronic diarrhea. He stated he has been eating and drinking but has lost 22 lbs unintentionally in 60 days. He has had difficulty urinating for quite some time and takes 0.8mg of flomax. He has not yet seen a urologist but is aware he has BPH. He denied any known history of prostate CA. He complains of leg cramps and cramps in his hands and feet intermittently. The patient denied any known exposure to Covid-19  but stated he lives with his brother who is still going out in public.      In the ER the CT PE protocol that showed no acute cardiopulmonary abnormality. Centrilobular emphysema. CT head showed no acute intracranial abnormality. Labs showed K 3.1, CRP 1.97, procalcitonin 0.05, d-dimer 1.15, WBC normal and lymphocytes normal. RVP negative. EKG showed sinus tachycardia 112, all other vitals normal, no hypoxia. Apparently in the ER he was wheezing. He was diagnosed with COPD exacerbation. His history was not reliable in the ER due to him being intoxicated at that time. He was tested for Covid-19 and given IV solumedrol. He was admitted for further observation.      Recommendation for Outpatient Providers:    on smoking cessation     Reasons For Change In Medications and Indications for New Medications:  Added symbicort/combivent   Day of Discharge     HPI:       Vital Signs:   Temp:  [97 °F (36.1 °C)-98.4 °F (36.9 °C)] 98.4 °F (36.9 °C)  Heart Rate:  [] 92  Resp:  [18-20] 20  BP: (118-160)/(81-95) 155/95     Physical Exam   Constitutional: He is oriented to person, place, and time. He appears well-developed and well-nourished. No distress.   HENT:   Head: Normocephalic and atraumatic.   Mouth/Throat: No oropharyngeal exudate.   Eyes: Pupils are equal, round, and reactive to light. Conjunctivae and EOM are normal. Right eye exhibits no discharge. Left eye exhibits no discharge. No scleral icterus.   Neck: Normal range of motion. No JVD present. No tracheal deviation present. No thyromegaly present.   Cardiovascular: Normal rate, regular rhythm and normal heart sounds. Exam reveals no gallop and no friction rub.   No murmur heard.  Pulmonary/Chest: Effort normal. No stridor. No respiratory distress. He has no wheezes. He has no rales. He exhibits no tenderness.   Decreased breath sounds akhil   Abdominal: Soft. Bowel sounds are normal. He exhibits no distension and no mass. There is no tenderness. There is no  rebound and no guarding. No hernia.   Musculoskeletal: Normal range of motion. He exhibits no edema, tenderness or deformity.   Lymphadenopathy:     He has no cervical adenopathy.   Neurological: He is alert and oriented to person, place, and time. No cranial nerve deficit or sensory deficit. He exhibits normal muscle tone. Coordination normal.   Skin: Skin is warm and dry. No rash noted. He is not diaphoretic. No erythema.   Psychiatric: He has a normal mood and affect. His behavior is normal.   Nursing note and vitals reviewed.      Pertinent  and/or Most Recent Results     Results from last 7 days   Lab Units 04/06/20  0356   WBC 10*3/mm3 8.10   HEMOGLOBIN g/dL 14.5   HEMATOCRIT % 40.8   PLATELETS 10*3/mm3 206   SODIUM mmol/L 143   POTASSIUM mmol/L 3.1*   CHLORIDE mmol/L 99   CO2 mmol/L 26.0   BUN mg/dL 11   CREATININE mg/dL 0.79   GLUCOSE mg/dL 140*   CALCIUM mg/dL 9.5     Results from last 7 days   Lab Units 04/06/20  0356   BILIRUBIN mg/dL 0.6   ALK PHOS U/L 116   ALT (SGPT) U/L 11   AST (SGOT) U/L 48*           Invalid input(s): TG, LDLCALC, LDLREALC  Results from last 7 days   Lab Units 04/06/20  0356   PROBNP pg/mL 157.7   TROPONIN T ng/mL <0.010   PROCALCITONIN ng/mL 0.05*       Brief Urine Lab Results  (Last result in the past 365 days)      Color   Clarity   Blood   Leuk Est   Nitrite   Protein   CREAT   Urine HCG        01/21/20 0212 Yellow Clear Negative Negative Negative Negative               Microbiology Results Abnormal     Procedure Component Value - Date/Time    SARS-CoV-2, PCR (IN-HOUSE), NP Swab in Transport Media - Swab, Nasopharynx [[id number]]  (Normal) Collected:  04/06/20 0616    Lab Status:  Final result Specimen:  Swab from Nasopharynx Updated:  04/06/20 1236     COVID19 Not Detected    Respiratory Panel, PCR - Swab, Nasopharynx [[id number]]  (Normal) Collected:  04/06/20 0423    Lab Status:  Final result Specimen:  Swab from Nasopharynx Updated:  04/06/20 0552     ADENOVIRUS, PCR Not  Detected     Coronavirus 229E Not Detected     Coronavirus HKU1 Not Detected     Coronavirus NL63 Not Detected     Coronavirus OC43 Not Detected     Human Metapneumovirus Not Detected     Human Rhinovirus/Enterovirus Not Detected     Influenza B PCR Not Detected     Parainfluenza Virus 1 Not Detected     Parainfluenza Virus 2 Not Detected     Parainfluenza Virus 3 Not Detected     Parainfluenza Virus 4 Not Detected     Bordetella pertussis pcr Not Detected     Influenza A H1 2009 PCR Not Detected     Chlamydophila pneumoniae PCR Not Detected     Mycoplasma pneumo by PCR Not Detected     Influenza A PCR Not Detected     Influenza A H3 Not Detected     Influenza A H1 Not Detected     RSV, PCR Not Detected    Narrative:       The coronavirus on the RVP is NOT COVID-19 and is NOT indicative of infection with COVID-19.           Ct Head Without Contrast    Result Date: 4/6/2020  Impression: 1. No acute intracranial abnormality. 2. Mild chronic age-related intracranial findings as above. 3. Moderate inflammation in the paranasal sinuses with evidence of prior functional endoscopic sinonasal surgery.  This examination was interpreted by Yousif Albert M.D. Electronically signed by:  Yousif Albert M.D.  4/6/2020 3:59 AM    Ct Chest Pulmonary Embolism    Result Date: 4/6/2020  Impression: 1. No acute cardiopulmonary abnormality. No pulmonary embolus. Lungs are clear. 2. Centrilobular emphysema. Electronically signed by:  Daren Wills M.D.  4/6/2020 4:02 AM    Xr Chest Ap    Result Date: 4/6/2020  Impression: No acute cardiopulmonary disease is seen radiographically.   Electronically Signed By-Dr. Tomás Smalls MD On:4/6/2020 4:54 AM This report was finalized on [id number] by Dr. Tomás Smalls MD.                Results for orders placed during the hospital encounter of 01/19/20   Adult Transthoracic Echo Complete W/ Cont if Necessary Per Protocol    Narrative · Left ventricular systolic function is  normal.  · There is calcification of the aortic valve.  · Mild mitral valve regurgitation is present  · Mild tricuspid valve regurgitation is present.  · LV ejection fraction is about 60 to 65%  · No pericardial effusion noted                  Test Results Pending at Discharge        Procedures Performed           Consults:   Consults     Date and Time Order Name Status Description    4/6/2020 0605 Hospitalist (on-call MD unless specified) Completed             Discharge Details        Discharge Medications      New Medications      Instructions Start Date   budesonide-formoterol 160-4.5 MCG/ACT inhaler  Commonly known as:  Symbicort   2 puffs, Inhalation, 2 Times Daily - RT      folic acid 1 MG tablet  Commonly known as:  FOLVITE   1 mg, Oral, Daily   Start Date:  April 7, 2020     ipratropium-albuterol  MCG/ACT inhaler  Commonly known as:  Combivent Respimat   1 puff, Inhalation, 4 Times Daily PRN      losartan 50 MG tablet  Commonly known as:  Cozaar   50 mg, Oral, Daily      melatonin 5 MG tablet tablet   5 mg, Oral, Nightly PRN      potassium chloride 20 MEQ CR tablet  Commonly known as:  K-DUR,KLOR-CON   20 mEq, Oral, Daily   Start Date:  April 7, 2020     predniSONE 10 MG (48) tablet pack  Commonly known as:  DELTASONE   40 mg po qd x 3 days, 30 mg po qd x 3 days, 20 mg po qd x 3 days,10 mg po qd x 3 days      thiamine 100 MG tablet  Commonly known as:  VITAMIN B1   100 mg, Oral, Daily   Start Date:  April 7, 2020        Continue These Medications      Instructions Start Date   CBD oil capsule  Commonly known as:  cannabidiol   1 capsule, Oral, Daily      coenzyme Q10 100 MG capsule   100 mg, Oral, Daily      DULoxetine 60 MG capsule  Commonly known as:  CYMBALTA   60 mg, Oral, Daily      omeprazole 20 MG capsule  Commonly known as:  priLOSEC   20 mg, Oral, Daily      tamsulosin 0.4 MG capsule 24 hr capsule  Commonly known as:  FLOMAX   2 capsules, Oral, Daily         Stop These Medications     hydroCHLOROthiazide 25 MG tablet  Commonly known as:  HYDRODIURIL            No Known Allergies      Discharge Disposition:  Home or Self Care    Diet:  Hospital:  Diet Order   Procedures   • Diet Cardiac; Healthy Heart         Discharge Activity:   Activity Instructions     Gradually Increase Activity Until at Pre-Hospitalization Level              CODE STATUS:    Code Status and Medical Interventions:   Ordered at: 04/06/20 0821     Level Of Support Discussed With:    Patient     Code Status:    CPR     Medical Interventions (Level of Support Prior to Arrest):    Full         Follow-up Appointments  No future appointments.          Condition on Discharge:      Stable      This patient has been examined wearing appropriate Personal Protective Equipment and discussed with hospital infection control department. 04/06/20      Electronically signed by Frederic Galvez MD, 04/06/20, 2:48 PM.      Time: I spent  45  minutes on this discharge activity which included face-to-face encounter with the patient/reviewing the data in the system/coordination of the care with the nursing staff as well as consultants/documentation/entering orders.

## 2020-04-06 NOTE — ED PROVIDER NOTES
Subjective   Chief complaint: Patient presents complaining of cough and shortness of breath.  He is concerned that he has coronavirus.  He does not know that he has any exposures however he states he has been around people in the last year that are from [place].  He states he also has had cough and shortness of breath for about 5 or 6 months.  Has been on multiple rounds of antibiotics is not getting any better.  Is coughing up lots of phlegm.  No blood.  He is also had about 8 shots of whiskey tonight.  He decided it was time for him to come in and be evaluated for coronavirus    Context: Patient is a chronic smoker and has recurrent infections.    Duration: Months to a year    Timing: Progressive    Severity: Moderate    Associated Symptoms: Diarrhea no vomiting.        PCP:            Review of Systems   Constitutional: Negative.  Negative for fever.   HENT: Negative.    Respiratory: Positive for chest tightness and shortness of breath.    Gastrointestinal: Positive for diarrhea.   Endocrine: Negative.    Genitourinary: Negative.    Musculoskeletal: Negative.    Skin: Negative.    Neurological: Negative.    Psychiatric/Behavioral: Negative.        Past Medical History:   Diagnosis Date   • Enlarged prostate    • High cholesterol    • Hypertension        No Known Allergies    Past Surgical History:   Procedure Laterality Date   • CARDIAC CATHETERIZATION N/A 1/21/2020    Procedure: Left Heart Cath and coronary angiogram;  Surgeon: Yannick Paige MD;  Location: [facility] CATH INVASIVE LOCATION;  Service: Cardiovascular   • CARDIAC CATHETERIZATION N/A 1/21/2020    Procedure: Left ventriculography;  Surgeon: Yannick Paige MD;  Location: [facility] CATH INVASIVE LOCATION;  Service: Cardiovascular   • COLONOSCOPY         Family History   Problem Relation Age of Onset   • Heart disease Mother    • Heart disease Father    • Diabetes Sister    • Heart disease Brother        Social History     Socioeconomic History   • Marital  status:      Spouse name: Not on file   • Number of children: Not on file   • Years of education: Not on file   • Highest education level: Not on file   Tobacco Use   • Smoking status: Current Every Day Smoker     Packs/day: 0.50     Years: 15.00     Pack years: 7.50     Types: Cigarettes   • Smokeless tobacco: Never Used   Substance and Sexual Activity   • Alcohol use: Yes     Alcohol/week: 10.0 standard drinks     Types: 10 Shots of liquor per week   • Drug use: Never   • Sexual activity: Defer           Objective   Physical Exam   Constitutional: He is oriented to person, place, and time. He appears well-developed and well-nourished.   HENT:   Head: Normocephalic.   Eyes: Pupils are equal, round, and reactive to light.   Neck: Neck supple.   Cardiovascular:   Tachycardic   Pulmonary/Chest: Effort normal. He has decreased breath sounds. He has rales in the right lower field.   Abdominal: Soft. Bowel sounds are normal.   Neurological: He is alert and oriented to person, place, and time.   Skin: Skin is warm and dry.   Psychiatric: He has a normal mood and affect. His behavior is normal.   Nursing note and vitals reviewed.      Procedures           ED Course            Results for orders placed or performed during the hospital encounter of 04/06/20   Respiratory Panel, PCR - Swab, Nasopharynx   Result Value Ref Range    ADENOVIRUS, PCR Not Detected Not Detected    Coronavirus 229E Not Detected Not Detected    Coronavirus HKU1 Not Detected Not Detected    Coronavirus NL63 Not Detected Not Detected    Coronavirus OC43 Not Detected Not Detected    Human Metapneumovirus Not Detected Not Detected    Human Rhinovirus/Enterovirus Not Detected Not Detected    Influenza B PCR Not Detected Not Detected    Parainfluenza Virus 1 Not Detected Not Detected    Parainfluenza Virus 2 Not Detected Not Detected    Parainfluenza Virus 3 Not Detected Not Detected    Parainfluenza Virus 4 Not Detected Not Detected    Bordetella  pertussis pcr Not Detected Not Detected    Influenza A H1 2009 PCR Not Detected Not Detected    Chlamydophila pneumoniae PCR Not Detected Not Detected    Mycoplasma pneumo by PCR Not Detected Not Detected    Influenza A PCR Not Detected Not Detected    Influenza A H3 Not Detected Not Detected    Influenza A H1 Not Detected Not Detected    RSV, PCR Not Detected Not Detected   Comprehensive Metabolic Panel   Result Value Ref Range    Glucose 140 (H) 65 - 99 mg/dL    BUN 11 8 - 23 mg/dL    Creatinine 0.79 0.76 - 1.27 mg/dL    Sodium 143 136 - 145 mmol/L    Potassium 3.1 (L) 3.5 - 5.2 mmol/L    Chloride 99 98 - 107 mmol/L    CO2 26.0 22.0 - 29.0 mmol/L    Calcium 9.5 8.6 - 10.5 mg/dL    Total Protein 7.6 6.0 - 8.5 g/dL    Albumin 4.10 3.50 - 5.20 g/dL    ALT (SGPT) 11 1 - 41 U/L    AST (SGOT) 48 (H) 1 - 40 U/L    Alkaline Phosphatase 116 39 - 117 U/L    Total Bilirubin 0.6 0.2 - 1.2 mg/dL    eGFR Non African Amer 98 >60 mL/min/1.73    Globulin 3.5 gm/dL    A/G Ratio 1.2 g/dL    BUN/Creatinine Ratio 13.9 7.0 - 25.0    Anion Gap 18.0 (H) 5.0 - 15.0 mmol/L   Lactate Dehydrogenase   Result Value Ref Range     135 - 225 U/L   Procalcitonin   Result Value Ref Range    Procalcitonin 0.05 (L) 0.10 - 0.25 ng/mL   C-reactive Protein   Result Value Ref Range    C-Reactive Protein 1.97 (H) 0.00 - 0.50 mg/dL   D-dimer, Quantitative   Result Value Ref Range    D-Dimer, Quantitative 1.15 (H) 0.17 - 0.59 MCGFEU/mL   BNP   Result Value Ref Range    proBNP 157.7 5.0 - 900.0 pg/mL   Troponin   Result Value Ref Range    Troponin T <0.010 0.000 - 0.030 ng/mL   CBC Auto Differential   Result Value Ref Range    WBC 8.10 3.40 - 10.80 10*3/mm3    RBC 4.16 4.14 - 5.80 10*6/mm3    Hemoglobin 14.5 13.0 - 17.7 g/dL    Hematocrit 40.8 37.5 - 51.0 %    MCV 98.0 (H) 79.0 - 97.0 fL    MCH 35.0 (H) 26.6 - 33.0 pg    MCHC 35.7 31.5 - 35.7 g/dL    RDW 14.1 12.3 - 15.4 %    RDW-SD 49.0 37.0 - 54.0 fl    MPV 7.3 6.0 - 12.0 fL    Platelets 206 140 -  450 10*3/mm3    Neutrophil % 60.9 42.7 - 76.0 %    Lymphocyte % 27.5 19.6 - 45.3 %    Monocyte % 10.1 5.0 - 12.0 %    Eosinophil % 0.9 0.3 - 6.2 %    Basophil % 0.6 0.0 - 1.5 %    Neutrophils, Absolute 4.90 1.70 - 7.00 10*3/mm3    Lymphocytes, Absolute 2.20 0.70 - 3.10 10*3/mm3    Monocytes, Absolute 0.80 0.10 - 0.90 10*3/mm3    Eosinophils, Absolute 0.10 0.00 - 0.40 10*3/mm3    Basophils, Absolute 0.00 0.00 - 0.20 10*3/mm3    nRBC 0.1 0.0 - 0.2 /100 WBC   Light Blue Top   Result Value Ref Range    Extra Tube hold for add-on    Green Top (Gel)   Result Value Ref Range    Extra Tube Hold for add-ons.    Lavender Top   Result Value Ref Range    Extra Tube     Gold Top - SST   Result Value Ref Range    Extra Tube Hold for add-ons.            ekg interpreted by me shows sinus tach rate of 112 nonspecific ST changes.                   Ct Head Without Contrast    Result Date: 4/6/2020  1. No acute intracranial abnormality. 2. Mild chronic age-related intracranial findings as above. 3. Moderate inflammation in the paranasal sinuses with evidence of prior functional endoscopic sinonasal surgery.  This examination was interpreted by Yousif Albert M.D. Electronically signed by:  Yousif Albert M.D.  4/6/2020 3:59 AM    Ct Chest Pulmonary Embolism    Result Date: 4/6/2020  1. No acute cardiopulmonary abnormality. No pulmonary embolus. Lungs are clear. 2. Centrilobular emphysema. Electronically signed by:  Daren Wills M.D.  4/6/2020 4:02 AM    Xr Chest Ap    Result Date: 4/6/2020  No acute cardiopulmonary disease is seen radiographically.   Electronically Signed By-Dr. Tomás Smalls MD On:4/6/2020 4:54 AM This report was finalized on [id number] by Dr. Tomás Smalls MD.            MDM  Number of Diagnoses or Management Options  COPD exacerbation (CMS/HCC):   Suspected Covid-19 Virus Infection:   Diagnosis management comments: Patient presents he has some tachycardia with diminished breath sounds scattered  wheeze and rales.  Think he is having a COPD exacerbation.  However I did rule out PE with a CT scan as his d-dimer was significantly elevated.  He is concerned with his shortness of breath and cough and diarrhea that he could have coronavirus.  This is a possibility and I will rule this out.  However his symptoms per history have been going on for months to a year.  Patient's history is not as reliable secondary to the fact that he is intoxicated.  So at this point time will go ahead and rule out coronavirus.  His CT chest showed centrilobular emphysema and no PE.  I feel that there is a left upper lobe small spiculated area concerning for mass.  I will relay this information to the admitting team.  This was not called by the radiologist on CT scan       Amount and/or Complexity of Data Reviewed  Clinical lab tests: reviewed  Tests in the radiology section of CPT®: reviewed  Tests in the medicine section of CPT®: reviewed  Discuss the patient with other providers: yes  Independent visualization of images, tracings, or specimens: yes    Patient Progress  Patient progress: stable      Final diagnoses:   None     Copd exacerbation  Lung mass  Suspect covid 19       Denis Holland DO  04/06/20 0621

## 2020-04-06 NOTE — OUTREACH NOTE
Prep Survey      Responses   Nondenominational facility patient discharged from?  [facility]   Is LACE score < 7 ?  No   Eligibility  Readm Mgmt   Discharge diagnosis  Dyspnea, possible COPD exacerbation, neg for Covid-19   COVID-19 Test Status  Negative   Does the patient have one of the following disease processes/diagnoses(primary or secondary)?  COPD/Pneumonia   Does the patient have Home health ordered?  No   Is there a DME ordered?  No   Prep survey completed?  Yes          Rafaela Hayes RN

## 2020-04-06 NOTE — H&P
[facility] Medicine Services      Patient Name: Rodolfo Regan  : 1955  MRN: [id number]  Primary Care Physician: Sancho Dover  Date of admission: 2020    Patient Care Team:  Sancho Dover as PCP - General (Internal Medicine)          Subjective   History Present Illness     Chief Complaint:   Chief Complaint   Patient presents with   • Shortness of Breath       Mr. Regan is a 64 y.o. with PMH of HTN, nonobstructive CAD, BPH, chronic diarrhea, GERD, tobacco abuse, and alcohol abuse who presented to [facility] ER 20 with complaints of right sided headache, dizziness, and syncope. At home last night he stood up from his chair, became dizzy and saw black, and then passed out. His brother brought him to the ER. He stated he has been weak and dizzy for quite some time. His dizziness mostly occurs when he goes from a sitting to standing position. He denied any lateralizing weakness or numbness or tingling. He stated his headache is right sided and is sharp, stabbing pain. The headache has been ongoing for nearly a year. He believes he has had a MRI brain at the [facility]. He has stopped drinking caffeine per his PCPs direction. Some of his other symptoms include shortness of breath and chest pain intermittently since 2019. He has had chills and sweats intermittently but these symptoms are not new and are ongoing. He denied any nausea or vomiting. He has chronic diarrhea. He stated he has been eating and drinking but has lost 22 lbs unintentionally in 60 days. He has had difficulty urinating for quite some time and takes 0.8mg of flomax. He has not yet seen a urologist but is aware he has BPH. He denied any known history of prostate CA. He complains of leg cramps and cramps in his hands and feet intermittently. The patient denied any known exposure to Covid-19 but stated he lives with his brother who is still going out in public.     In the ER the CT PE protocol that showed no acute  cardiopulmonary abnormality. Centrilobular emphysema. CT head showed no acute intracranial abnormality. Labs showed K 3.1, CRP 1.97, procalcitonin 0.05, d-dimer 1.15, WBC normal and lymphocytes normal. RVP negative. EKG showed sinus tachycardia 112, all other vitals normal, no hypoxia. Apparently in the ER he was wheezing. He was diagnosed with COPD exacerbation. His history was not reliable in the ER due to him being intoxicated at that time. He was tested for Covid-19 and given IV solumedrol. He was admitted for further observation.       Review of Systems   Constitution: Positive for chills, diaphoresis and weight loss.   HENT: Negative.    Eyes: Negative.    Cardiovascular: Positive for chest pain and syncope. Negative for leg swelling.   Respiratory: Positive for cough, shortness of breath and wheezing.    Endocrine: Negative.    Hematologic/Lymphatic: Negative.    Skin: Negative.    Musculoskeletal: Positive for muscle cramps.   Gastrointestinal: Positive for diarrhea.   Genitourinary: Positive for hesitancy and incomplete emptying.   Neurological: Positive for dizziness. Negative for paresthesias and seizures.   Psychiatric/Behavioral: Negative.    Allergic/Immunologic: Negative.    All other systems reviewed and are negative.        Personal History     Past Medical History:   Past Medical History:   Diagnosis Date   • Chronic diarrhea    • Enlarged prostate    • High cholesterol    • Hypertension        Surgical History:      Past Surgical History:   Procedure Laterality Date   • CARDIAC CATHETERIZATION N/A 1/21/2020    Procedure: Left Heart Cath and coronary angiogram;  Surgeon: Yannick Paige MD;  Location: [facility] CATH INVASIVE LOCATION;  Service: Cardiovascular   • CARDIAC CATHETERIZATION N/A 1/21/2020    Procedure: Left ventriculography;  Surgeon: Yannick Paige MD;  Location: [facility] CATH INVASIVE LOCATION;  Service: Cardiovascular   • COLONOSCOPY     • HERNIA REPAIR         Family History: family  history includes Diabetes in his sister; Heart disease in his brother, father, and mother.     Social History:  reports that he has been smoking cigarettes. He has a 7.50 pack-year smoking history. He has never used smokeless tobacco. He reports that he drinks about 10.0 standard drinks of alcohol per week. He reports that he does not use drugs.      Medications:  Prior to Admission medications    Medication Sig Start Date End Date Taking? Authorizing Provider   CBD oil (cannabidiol) capsule Take 1 capsule by mouth Daily.    Teetee Garg MD   coenzyme Q10 100 MG capsule Take 100 mg by mouth Daily.    Teetee Garg MD   DULoxetine (CYMBALTA) 60 MG capsule Take 60 mg by mouth Daily.    Teetee Garg MD   hydroCHLOROthiazide (HYDRODIURIL) 25 MG tablet Take 25 mg by mouth Daily.    Teetee Garg MD   omeprazole (priLOSEC) 20 MG capsule Take 20 mg by mouth Daily.    Teetee Garg MD   tamsulosin (FLOMAX) 0.4 MG capsule 24 hr capsule Take 2 capsules by mouth Daily.    Teetee Garg MD       Allergies:  No Known Allergies    Objective   Objective     Vital Signs  Temp:  [97 °F (36.1 °C)-98 °F (36.7 °C)] 97 °F (36.1 °C)  Heart Rate:  [] 94  Resp:  [18-20] 18  BP: (118-143)/(81-94) 143/82  SpO2:  [95 %-98 %] 98 %  on   ;   Device (Oxygen Therapy): room air  Body mass index is 22.56 kg/m².    Physical Exam TO BE COMPLETED BY ATTENDING PHYSICIAN    Results Review:  I have personally reviewed most recent cardiac tracings, lab results and radiology images and interpretations and agree with findings.    Results from last 7 days   Lab Units 04/06/20  0356   WBC 10*3/mm3 8.10   HEMOGLOBIN g/dL 14.5   HEMATOCRIT % 40.8   PLATELETS 10*3/mm3 206     Results from last 7 days   Lab Units 04/06/20  0356   SODIUM mmol/L 143   POTASSIUM mmol/L 3.1*   CHLORIDE mmol/L 99   CO2 mmol/L 26.0   BUN mg/dL 11   CREATININE mg/dL 0.79   GLUCOSE mg/dL 140*   CALCIUM mg/dL 9.5   ALT (SGPT) U/L 11    AST (SGOT) U/L 48*   TROPONIN T ng/mL <0.010   PROBNP pg/mL 157.7   PROCALCITONIN ng/mL 0.05*     Estimated Creatinine Clearance: 87.6 mL/min (by C-G formula based on SCr of 0.79 mg/dL).  Brief Urine Lab Results  (Last result in the past 365 days)      Color   Clarity   Blood   Leuk Est   Nitrite   Protein   CREAT   Urine HCG        01/21/20 0212 Yellow Clear Negative Negative Negative Negative               Microbiology Results (last 10 days)     Procedure Component Value - Date/Time    Respiratory Panel, PCR - Swab, Nasopharynx [[id number]]  (Normal) Collected:  04/06/20 0423    Lab Status:  Final result Specimen:  Swab from Nasopharynx Updated:  04/06/20 0552     ADENOVIRUS, PCR Not Detected     Coronavirus 229E Not Detected     Coronavirus HKU1 Not Detected     Coronavirus NL63 Not Detected     Coronavirus OC43 Not Detected     Human Metapneumovirus Not Detected     Human Rhinovirus/Enterovirus Not Detected     Influenza B PCR Not Detected     Parainfluenza Virus 1 Not Detected     Parainfluenza Virus 2 Not Detected     Parainfluenza Virus 3 Not Detected     Parainfluenza Virus 4 Not Detected     Bordetella pertussis pcr Not Detected     Influenza A H1 2009 PCR Not Detected     Chlamydophila pneumoniae PCR Not Detected     Mycoplasma pneumo by PCR Not Detected     Influenza A PCR Not Detected     Influenza A H3 Not Detected     Influenza A H1 Not Detected     RSV, PCR Not Detected    Narrative:       The coronavirus on the RVP is NOT COVID-19 and is NOT indicative of infection with COVID-19.           ECG/EMG Results (most recent)     Procedure Component Value Units Date/Time    ECG 12 Lead [[id number]] Collected:  04/06/20 0353     Updated:  04/06/20 0355    Narrative:       HEART RATE= 112  bpm  RR Interval= 536  ms  OK Interval= 117  ms  P Horizontal Axis= -13  deg  P Front Axis= 51  deg  QRSD Interval= 77  ms  QT Interval= 328  ms  QRS Axis= 55  deg  T Wave Axis= 76  deg  - OTHERWISE NORMAL ECG -  Sinus  tachycardia  Electronically Signed By:   Date and Time of Study: 2020-04-06 03:53:11               Results for orders placed during the hospital encounter of 01/19/20   Adult Transthoracic Echo Complete W/ Cont if Necessary Per Protocol    Narrative · Left ventricular systolic function is normal.  · There is calcification of the aortic valve.  · Mild mitral valve regurgitation is present  · Mild tricuspid valve regurgitation is present.  · LV ejection fraction is about 60 to 65%  · No pericardial effusion noted          Ct Head Without Contrast    Result Date: 4/6/2020  1. No acute intracranial abnormality. 2. Mild chronic age-related intracranial findings as above. 3. Moderate inflammation in the paranasal sinuses with evidence of prior functional endoscopic sinonasal surgery.  This examination was interpreted by Yousif Albert M.D. Electronically signed by:  Yousif Albert M.D.  4/6/2020 3:59 AM    Ct Chest Pulmonary Embolism    Result Date: 4/6/2020  1. No acute cardiopulmonary abnormality. No pulmonary embolus. Lungs are clear. 2. Centrilobular emphysema. Electronically signed by:  Daren Wills M.D.  4/6/2020 4:02 AM    Xr Chest Ap    Result Date: 4/6/2020  No acute cardiopulmonary disease is seen radiographically.   Electronically Signed By-Dr. Tomás Smalls MD On:4/6/2020 4:54 AM This report was finalized on [id number] by Dr. Tomás Smalls MD.        Estimated Creatinine Clearance: 87.6 mL/min (by C-G formula based on SCr of 0.79 mg/dL).    Assessment/Plan   Assessment/Plan       Active Hospital Problems    Diagnosis  POA   • **Syncope [R55]  Yes     Priority: High   • COPD exacerbation (CMS/[facility]) [J44.1]  Yes     Priority: High   • Suspected Covid-19 Virus Infection [R68.89]  Unknown     Priority: High   • Headache [R51]  Yes     Priority: High   • Dizziness [R42]  Yes     Priority: High   • Unintentional weight loss [R63.4]  Yes     Priority: Medium   • Hypokalemia [E87.6]  Yes      Priority: Medium   • Chronic diarrhea [K52.9]  Yes   • Alcohol abuse [F10.10]  Yes   • Tobacco abuse [Z72.0]  Yes   • Essential hypertension [I10]  Yes   • GERD without esophagitis [K21.9]  Yes   • BPH without obstruction/lower urinary tract symptoms [N40.0]  Yes      Resolved Hospital Problems   No resolved problems to display.     Syncope, headache, dizziness  -CT head showed no acute intracranial abnormality.  -EKG showed sinus tachycardia  -check orthostatic vitals signs, hold HCTZ, request MRI brain records from [facility], continue bedside cardiac monitor, prn fioricet, consider neurology evaluation     Dyspnea, possible COPD exacerbation, r/o Covid-19  -CT PE protocol showed no acute cardiopulmonary abnormality. Centrilobular emphysema.  -CRP 1.97, procalcitonin 0.05, LDH normal 199, d-dimer 1.15, troponin normal, WBC normal and lymphocytes normal. RVP negative.  -given one dose of IV solumedrol in ER, will hold off on further steroids for now  -prn albuterol inhaler  -Covid-19 rule out pending     Hypokalemia  -K 3.1  -replacement protocol, check Mg and replace as needed. Pt's leg cramps likely from electrolyte imbalance    Unintentional weight loss  -BMP 22.56  -needs further outpatient workup    Hypertension  -controlled. Hold HCTZ due to hypokalemia, leg cramps, and syncope    Nonobstructive CAD  -cardiac catheterization 1/21/20 non obstructive CAD with normal LV function    Chronic diarrhea  -prn imodium    GERD  -cont home PPI    BPH  -cont home flomax    Chronic alcohol abuse  -drinks 1 liter every 1-2 weeks  -monitor for withdrawal  -MVI, folic acid, thiamine    Tobacco abuse  -nicotine patch prn  -encourage cessation    Spoke with patient over the phone, note created for Dr. Galvez who will physically examine the patient.     VTE Prophylaxis -   Mechanical Order History:      Ordered        04/06/20 0821  Place Sequential Compression Device  Once         04/06/20 0821  Maintain Sequential  Compression Device  Continuous                 Pharmalogical Order History:     None          CODE STATUS:    Code Status and Medical Interventions:   Ordered at: 04/06/20 0821     Level Of Support Discussed With:    Patient     Code Status:    CPR     Medical Interventions (Level of Support Prior to Arrest):    Full       I discussed the patient's findings and my recommendations with patient.        Electronically signed by KIM Gonzalez, 04/06/20, 8:44 AM.  [facility] Hospitalist Team

## 2020-04-08 ENCOUNTER — READMISSION MANAGEMENT (OUTPATIENT)
Dept: CALL CENTER | Facility: HOSPITAL | Age: 65
End: 2020-04-08

## 2020-04-08 NOTE — OUTREACH NOTE
COPD/PN Week 1 Survey      Responses   [facility] patient discharged from?  [facility]   COVID-19 Test Status  Negative   Does the patient have one of the following disease processes/diagnoses(primary or secondary)?  COPD/Pneumonia   Is there a successful TCM telephone encounter documented?  No   Was the primary reason for admission:  COPD exacerbation   Week 1 attempt successful?  No   Unsuccessful attempts  Attempt 1          Kira Jaquez RN

## 2020-04-09 ENCOUNTER — READMISSION MANAGEMENT (OUTPATIENT)
Dept: CALL CENTER | Facility: HOSPITAL | Age: 65
End: 2020-04-09

## 2020-04-09 ENCOUNTER — NURSE TRIAGE (OUTPATIENT)
Dept: CALL CENTER | Facility: HOSPITAL | Age: 65
End: 2020-04-09

## 2020-04-09 NOTE — TELEPHONE ENCOUNTER
"[phone]- Is the phone number.     Reason for Disposition  • General information question, no triage required and triager able to answer question    Additional Information  • Negative: [1] Caller is not with the adult (patient) AND [2] reporting urgent symptoms  • Negative: Lab result questions  • Negative: Medication questions  • Negative: Caller can't be reached by phone  • Negative: Caller has already spoken to PCP or another triager  • Negative: RN needs further essential information from caller in order to complete triage  • Negative: Requesting regular office appointment  • Negative: [1] Caller requesting NON-URGENT health information AND [2] PCP's office is the best resource  • Negative: Health Information question, no triage required and triager able to answer question    Answer Assessment - Initial Assessment Questions  1. REASON FOR CALL or QUESTION: \"What is your reason for calling today?\" or \"How can I best help you?\" or \"What question do you have that I can help answer?\"      See note    Protocols used: INFORMATION ONLY CALL-ADULT-      "

## 2020-04-09 NOTE — OUTREACH NOTE
COPD/PN Week 1 Survey      Responses   [facility] patient discharged from?  [facility]   COVID-19 Test Status  Negative   Does the patient have one of the following disease processes/diagnoses(primary or secondary)?  COPD/Pneumonia   Is there a successful TCM telephone encounter documented?  No   Was the primary reason for admission:  COPD exacerbation   Week 1 attempt successful?  No   Unsuccessful attempts  Attempt 2          Kira Jaquez RN

## 2020-04-10 ENCOUNTER — READMISSION MANAGEMENT (OUTPATIENT)
Dept: CALL CENTER | Facility: HOSPITAL | Age: 65
End: 2020-04-10

## 2020-04-10 NOTE — OUTREACH NOTE
COPD/PN Week 1 Survey      Responses   [facility] patient discharged from?  [facility]   COVID-19 Test Status  Negative   Does the patient have one of the following disease processes/diagnoses(primary or secondary)?  COPD/Pneumonia   Is there a successful TCM telephone encounter documented?  No   Was the primary reason for admission:  COPD exacerbation   Week 1 attempt successful?  Yes   Call start time  1249   Call end time  1320   General alerts for this patient  Prefers to be called after noon   Discharge diagnosis  Dyspnea, possible COPD exacerbation, neg for Covid-19   Is patient permission given to speak with other caregiver?  Yes   List who call center can speak with  brother   Meds reviewed with patient/caregiver?  Yes   Is the patient having any side effects they believe may be caused by any medication additions or changes?  No   Does the patient have all medications ordered at discharge?  Yes   Is the patient taking all medications as directed (includes completed medication regime)?  Yes   Medication comments  States he believes in his heart that he has a sinus infection that won't go away.    Does the patient have a primary care provider?   Yes   Does the patient have an appointment with their PCP or pulmonologist within 7 days of discharge?  Greater than 7 days   [facility]   Has the patient kept scheduled appointments due by today?  N/A   Comments  [facility]elled [facility] app[facility] d[facility]t [facility]   Has home health visited the patient within 72 hours of discharge?  N/A   Psychosocial issues?  No   Did the patient receive a copy of their discharge instructions?  Yes   Nursing interventions  Reviewed instructions with patient   What is the patient's perception of their health status since discharge?  Improving   Nursing Interventions  Nurse provided patient education   Are the patient's immunizations up to date?   -- [UTD on flu, unsure about pneumonia]   Nursing interventions  Educated on importance of  "maintaining up to date immunizations as advised by provider   Is the patient/caregiver able to teach back the hierarchy of who to call/visit for symptoms/problems? PCP, Specialist, Home health nurse, Urgent Care, ED, 911  Yes   Is the patient able to teach back COPD zones?  Yes   Nursing interventions  Education provided on various zones   Patient reports what zone on this call?  Yellow Zone   Yellow Zone  Poor Appetite, Increased or thicker phlegm/mucus, Increased shortness of air [SOA at times]   Yellow interventions  Do not smoke, Use other meds such as steroids or antibiotics as ordered, Get plenty of rest, Call provider immediatly if symptoms do not improve   Week 1 call completed?  Yes   Wrap up additional comments  States [facility]. Asking nurse to call in \"off time\" repeatedly and hitting on nurse during call,           Jazmyne Jaquez RN  "

## 2020-04-13 ENCOUNTER — READMISSION MANAGEMENT (OUTPATIENT)
Dept: CALL CENTER | Facility: HOSPITAL | Age: 65
End: 2020-04-13

## 2020-04-13 NOTE — OUTREACH NOTE
COPD/PN Week 1 Survey      Responses   [facility] patient discharged from?  [facility]   COVID-19 Test Status  Negative   Does the patient have one of the following disease processes/diagnoses(primary or secondary)?  COPD/Pneumonia   Is there a successful TCM telephone encounter documented?  No   Was the primary reason for admission:  COPD exacerbation   Week 1 attempt successful?  Yes   Call start time  1041   Call end time  1048   Discharge diagnosis  Dyspnea, possible COPD exacerbation, neg for Covid-19   Is patient permission given to speak with other caregiver?  Yes   List who call center can speak with  brother   Meds reviewed with patient/caregiver?  Yes   Is the patient having any side effects they believe may be caused by any medication additions or changes?  No   Does the patient have all medications ordered at discharge?  Yes   Is the patient taking all medications as directed (includes completed medication regime)?  Yes   Does the patient have a primary care provider?   Yes   Comments regarding PCP  PCP Sancho Dover. Advised to contact for telemedicine appt for hospital f/u.    Has the patient kept scheduled appointments due by today?  N/A   Has home health visited the patient within 72 hours of discharge?  N/A   DME comments  Patient reports that he does not have any respiratory equipment at home,  only uses inhaler medication.    Psychosocial issues?  No   Did the patient receive a copy of their discharge instructions?  Yes   Nursing interventions  Reviewed instructions with patient   What is the patient's perception of their health status since discharge?  Improving   Is the patient/caregiver able to teach back the hierarchy of who to call/visit for symptoms/problems? PCP, Specialist, Home health nurse, Urgent Care, ED, 911  Yes   Additional teach back comments  Advised to monitor for fever. Patient reports that he is staying on home isolation. Patient to wear mask with need to go out for neccessary  medical care.    Patient reports what zone on this call?  Green Zone   Green Zone  Reports doing well   Green Zone interventions:  Take daily medications   Week 1 call completed?  Yes   Wrap up additional comments  Patient reports that he is doing well this morning. Denies any new questions or concerns.           Desirae Alvarado RN

## 2020-04-15 ENCOUNTER — READMISSION MANAGEMENT (OUTPATIENT)
Dept: CALL CENTER | Facility: HOSPITAL | Age: 65
End: 2020-04-15

## 2020-04-15 NOTE — OUTREACH NOTE
COPD/PN Week 2 Survey      Responses   [facility] patient discharged from?  [facility]   COVID-19 Test Status  Negative   Does the patient have one of the following disease processes/diagnoses(primary or secondary)?  COPD/Pneumonia   Was the primary reason for admission:  COPD exacerbation   Week 2 attempt successful?  No   Unsuccessful attempts  Attempt 1          Amrita Oliver LPN

## 2020-04-18 ENCOUNTER — READMISSION MANAGEMENT (OUTPATIENT)
Dept: CALL CENTER | Facility: HOSPITAL | Age: 65
End: 2020-04-18

## 2020-04-18 NOTE — OUTREACH NOTE
COPD/PN Week 2 Survey      Responses   [facility] patient discharged from?  [facility]   COVID-19 Test Status  Negative   Does the patient have one of the following disease processes/diagnoses(primary or secondary)?  COPD/Pneumonia   Was the primary reason for admission:  COPD exacerbation   Week 2 attempt successful?  No          Justa Diaz, RN

## 2020-04-25 ENCOUNTER — READMISSION MANAGEMENT (OUTPATIENT)
Dept: CALL CENTER | Facility: HOSPITAL | Age: 65
End: 2020-04-25

## 2020-04-25 NOTE — OUTREACH NOTE
COPD/PN Week 3 Survey      Responses   [facility] patient discharged from?  [facility]   COVID-19 Test Status  Negative   Does the patient have one of the following disease processes/diagnoses(primary or secondary)?  COPD/Pneumonia   Was the primary reason for admission:  COPD exacerbation   Week 3 attempt successful?  No          Aristeo Cooper RN

## 2020-05-02 ENCOUNTER — READMISSION MANAGEMENT (OUTPATIENT)
Dept: CALL CENTER | Facility: HOSPITAL | Age: 65
End: 2020-05-02

## 2020-05-02 NOTE — OUTREACH NOTE
"COPD/PN Week 4 Survey      Responses   [facility] patient discharged from?  [facility]   COVID-19 Test Status  Negative   Does the patient have one of the following disease processes/diagnoses(primary or secondary)?  COPD/Pneumonia   Was the primary reason for admission:  COPD exacerbation   Week 4 attempt successful?  Yes   Call start time  1609   Call end time  1615   General alerts for this patient  Prefers to be called after noon   Discharge diagnosis  Dyspnea, possible COPD exacerbation, neg for Covid-19   Meds reviewed with patient/caregiver?  Yes   Is the patient having any side effects they believe may be caused by any medication additions or changes?  No   Is the patient taking all medications as directed (includes completed medication regime)?  Yes   Has the patient kept scheduled appointments due by today?  Yes   Comments  Pt reports he [facility] who've scheduled him to have a \"lymph node removed and a possible stent.\"    Is the patient still receiving Home Health Services?  N/A   Psychosocial issues?  No   Comments  Pt reports he still get dizzy but denies passing out. Reports he is [facility] to figure out the problem.    What is the patient's perception of their health status since discharge?  Same   Nursing Interventions  Nurse provided patient education   Nursing interventions  Educated on importance of maintaining up to date immunizations as advised by provider   Is the patient/caregiver able to teach back the hierarchy of who to call/visit for symptoms/problems? PCP, Specialist, Home health nurse, Urgent Care, ED, 911  Yes   Additional teach back comments  Advised pt if he has chest pain, increased SOA or syncopal episodes to return to the ER for evaluation.    Is the patient able to teach back COPD zones?  Yes   Nursing interventions  Education provided on various zones   Patient reports what zone on this call?  Yellow Zone   Green Zone  Usual amount of " phlegm/mucus without difficulty coughing up   Yellow Zone  Unable to complete daily activities, Fever or feeling like have a chest cold   Yellow interventions  Do not smoke, Use other meds such as steroids or antibiotics as ordered, Get plenty of rest, Call provider immediatly if symptoms do not improve   Week 4 call completed?  Yes   Would the patient like one additional call?  Yes          Aristeo Cooper, RN

## 2020-05-11 ENCOUNTER — READMISSION MANAGEMENT (OUTPATIENT)
Dept: CALL CENTER | Facility: HOSPITAL | Age: 65
End: 2020-05-11

## 2020-05-11 NOTE — OUTREACH NOTE
COPD/PN Week 5 Survey      Responses   [facility] patient discharged from?  [facility]   COVID-19 Test Status  Negative   Does the patient have one of the following disease processes/diagnoses(primary or secondary)?  COPD/Pneumonia   Was the primary reason for admission:  COPD exacerbation   Week 5 attempt successful?  No          Desirae Alvarado RN

## 2020-07-23 ENCOUNTER — HOSPITAL ENCOUNTER (EMERGENCY)
Facility: HOSPITAL | Age: 65
Discharge: HOME | End: 2020-07-23
Attending: EMERGENCY MEDICINE | Admitting: EMERGENCY MEDICINE

## 2020-07-23 VITALS
RESPIRATION RATE: 18 BRPM | WEIGHT: 150 LBS | TEMPERATURE: 99.1 F | HEART RATE: 90 BPM | HEIGHT: 72 IN | DIASTOLIC BLOOD PRESSURE: 72 MMHG | BODY MASS INDEX: 20.32 KG/M2 | OXYGEN SATURATION: 99 % | SYSTOLIC BLOOD PRESSURE: 109 MMHG

## 2020-07-23 DIAGNOSIS — S00.01XA ABRASION, SCALP W/O INFECTION: Primary | ICD-10-CM

## 2020-07-23 DIAGNOSIS — F10.929 ALCOHOLIC INTOXICATION WITH COMPLICATION (HCC): ICD-10-CM

## 2020-07-23 DIAGNOSIS — E87.6 HYPOKALEMIA: ICD-10-CM
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
## 2020-07-23 DIAGNOSIS — R56.9 SEIZURE (HCC): ICD-10-CM

## 2020-07-23 LAB
ANION GAP SERPL CALCULATED.3IONS-SCNC: 17 MMOL/L (ref 5–15)
BUN SERPL-MCNC: 7 MG/DL (ref 8–23)
BUN SERPL-MCNC: ABNORMAL MG/DL
BUN/CREAT SERPL: ABNORMAL
CALCIUM SPEC-SCNC: 8.7 MG/DL (ref 8.6–10.5)
CHLORIDE SERPL-SCNC: 101 MMOL/L (ref 98–107)
CO2 SERPL-SCNC: 21 MMOL/L (ref 22–29)
CREAT SERPL-MCNC: 0.79 MG/DL (ref 0.76–1.27)
ETHANOL UR QL: 0.17 %
GFR SERPL CREATININE-BSD FRML MDRD: 98 ML/MIN/1.73
GLUCOSE SERPL-MCNC: 113 MG/DL (ref 65–99)
HOLD SPECIMEN: NORMAL
POTASSIUM SERPL-SCNC: 2.9 MMOL/L (ref 3.5–5.2)
SODIUM SERPL-SCNC: 139 MMOL/L (ref 136–145)

## 2020-07-23 PROCEDURE — 80048 BASIC METABOLIC PNL TOTAL CA: CPT | Performed by: EMERGENCY MEDICINE

## 2020-07-23 PROCEDURE — 80307 DRUG TEST PRSMV CHEM ANLYZR: CPT | Performed by: EMERGENCY MEDICINE

## 2020-07-23 PROCEDURE — 96365 THER/PROPH/DIAG IV INF INIT: CPT

## 2020-07-23 PROCEDURE — 99284 EMERGENCY DEPT VISIT MOD MDM: CPT

## 2020-07-23 RX ORDER — SODIUM CHLORIDE 0.9 % (FLUSH) 0.9 %
10 SYRINGE (ML) INJECTION AS NEEDED
Status: DISCONTINUED | OUTPATIENT
Start: 2020-07-23 | End: 2020-07-23 | Stop reason: HOSPADM

## 2020-07-23 RX ORDER — POTASSIUM CHLORIDE 20 MEQ/1
20 TABLET, EXTENDED RELEASE ORAL DAILY
Status: DISCONTINUED | OUTPATIENT
Start: 2020-07-23 | End: 2020-07-23 | Stop reason: HOSPADM

## 2020-07-23 RX ORDER — VALPROIC ACID 250 MG/1
250 CAPSULE, LIQUID FILLED ORAL 2 TIMES DAILY
Qty: 60 CAPSULE | Refills: 0 | Status: SHIPPED | OUTPATIENT
Start: 2020-07-23 | End: 2020-08-22

## 2020-07-23 RX ADMIN — VALPROATE SODIUM 250 MG: 100 INJECTION, SOLUTION INTRAVENOUS at 17:15

## 2020-07-23 RX ADMIN — POTASSIUM CHLORIDE 20 MEQ: 1500 TABLET, EXTENDED RELEASE ORAL at 17:28

## 2020-07-23 NOTE — ED PROVIDER NOTES
Subjective   History of Present Illness  64-year-old male with a history of alcohol abuse states that he had a grand mal seizure today and he fell to the floor and hit the back of his head.  The patient complains of some pain at the site.  Denies any other injury.  He states that this is been occurring for a couple years.  The patient is supposed to be on Depakote for seizures but he is noncompliant.  Review of Systems  Denies any recent fever chills cough congestion nausea vomiting diarrhea numbness or tingling.  Past Medical History:   Diagnosis Date   • Chronic diarrhea    • Enlarged prostate    • High cholesterol    • Hypertension        No Known Allergies    Past Surgical History:   Procedure Laterality Date   • CARDIAC CATHETERIZATION N/A 1/21/2020    Procedure: Left Heart Cath and coronary angiogram;  Surgeon: Yannick Paige MD;  Location: [facility] CATH INVASIVE LOCATION;  Service: Cardiovascular   • CARDIAC CATHETERIZATION N/A 1/21/2020    Procedure: Left ventriculography;  Surgeon: Yannick Paige MD;  Location: [facility] CATH INVASIVE LOCATION;  Service: Cardiovascular   • COLONOSCOPY     • HERNIA REPAIR         Family History   Problem Relation Age of Onset   • Heart disease Mother    • Heart disease Father    • Diabetes Sister    • Heart disease Brother        Social History     Socioeconomic History   • Marital status:      Spouse name: Not on file   • Number of children: Not on file   • Years of education: Not on file   • Highest education level: Not on file   Tobacco Use   • Smoking status: Current Every Day Smoker     Packs/day: 0.50     Years: 15.00     Pack years: 7.50     Types: Cigarettes   • Smokeless tobacco: Never Used   Substance and Sexual Activity   • Alcohol use: Yes     Alcohol/week: 10.0 standard drinks     Types: 10 Shots of liquor per week   • Drug use: Yes     Types: Marijuana     Comment: occasional   • Sexual activity: Defer           Objective   Physical Exam  Patient currently is  awake alert and oriented x3 he is afebrile the HEENT exam reveals a superficial abrasion to the occipital scalp area.  There is no significant hematoma.  Pupils equal round reactive to light EOMs are full no blood or drainage from the ears or from the nose there is no laceration to the time his chest is clear cardiovascular exam reveals a regular rhythm his abdomen was soft nontender he has no cyanosis clubbing or edema neurologic exam cranial nerves II through XII are intact he does have some horizontal nystagmus no focal deficit was noted  Procedures           ED Course                 Results for orders placed or performed during the hospital encounter of 07/23/20   Ethanol   Result Value Ref Range    Ethanol % 0.168 %   Basic Metabolic Panel   Result Value Ref Range    Glucose 113 (H) 65 - 99 mg/dL    BUN      Creatinine 0.79 0.76 - 1.27 mg/dL    Sodium 139 136 - 145 mmol/L    Potassium 2.9 (L) 3.5 - 5.2 mmol/L    Chloride 101 98 - 107 mmol/L    CO2 21.0 (L) 22.0 - 29.0 mmol/L    Calcium 8.7 8.6 - 10.5 mg/dL    eGFR Non African Amer 98 >60 mL/min/1.73    BUN/Creatinine Ratio      Anion Gap 17.0 (H) 5.0 - 15.0 mmol/L   BUN   Result Value Ref Range    BUN 7 (L) 8 - 23 mg/dL   Gold Top - SST   Result Value Ref Range    Extra Tube Hold for add-ons.      Medications   sodium chloride 0.9 % flush 10 mL (has no administration in time range)   valproate (DEPACON) 250 mg in dextrose (D5W) 5 % 100 mL IVPB (250 mg Intravenous New Bag 7/23/20 1715)   lactated ringers bolus 1,000 mL (has no administration in time range)   potassium chloride (K-DUR,KLOR-CON) CR tablet 20 mEq (20 mEq Oral Given 7/23/20 1728)     No radiology results for the last day                               MDM  The patient was treated with IV fluids containing potassium as well as p.o. potassium and also a dose of Depacon intravenously.  The patient apparently has been on Depakote in the past but is not taking that currently.  He was also advised of the  association with alcohol.  The abrasion was cleansed and bacitracin applied  Final diagnoses:   Abrasion, scalp w/o infection   Fall, initial encounter   Seizure (CMS/HCC)   Hypokalemia   Alcoholic intoxication with complication (CMS/[facility])            Maverick Benítez MD  07/23/20 6912

## 2020-07-23 NOTE — ED NOTES
"Pt has increased agitation, pt turns off monitor d/t \"it makes a noise and I don't like it\" pt voiced to this nurse \"my aggravation level is increasing\" pt noted to be restless but cooperative with staff.     Naty Brush, RN  07/23/20 1139    "

## 2020-07-23 NOTE — DISCHARGE INSTRUCTIONS
Decrease your alcohol intake.  Valproic acid 2 times a day to prevent seizures.  Follow-up with your doctor in 1 week for a recheck.

## 2022-07-12 RX ORDER — METOPROLOL SUCCINATE 50 MG/1
TABLET, EXTENDED RELEASE ORAL
Qty: 90 TABLET | OUTPATIENT
Start: 2022-07-12

## 2022-10-24 NOTE — PROGRESS NOTES
Case Management Discharge Note      Final Note: DC to home           Final Discharge Disposition Code: 01 - home or self-care  
Continued Stay Note  FATOUMATA Cuellar     Patient Name: Rodolfo Regan  MRN: [id number]  Today's Date: 4/6/2020    Admit Date: 4/6/2020    Discharge Plan     Row Name 04/06/20 1435       Plan    Plan Comments  SW inquired with RN regarding pt mentation in order to discuss ETOH resources. RN states pt is declining all ETOH resources for recovery at this time.         Discharge Codes    No documentation.         CASSIUS Alonso    Phone # [phone]  Cell #[phone]  Fax#[phone]  [email or web address]      CASSIUS Alonso    
Initial (On Arrival)

## 2023-01-06 RX ORDER — METOPROLOL SUCCINATE 50 MG/1
TABLET, EXTENDED RELEASE ORAL
Qty: 90 TABLET | Refills: 3 | Status: SHIPPED | OUTPATIENT
Start: 2023-01-06

## (undated) DEVICE — GW DIAG EMERALD HEPCOAT MOVE JTIP STD .035 3MM 150CM

## (undated) DEVICE — CATH DIAG IMPULSE FL4 6F 100CM

## (undated) DEVICE — PINNACLE INTRODUCER SHEATH: Brand: PINNACLE

## (undated) DEVICE — CATH DIAG IMPULSE PIG .056 6F 110CM

## (undated) DEVICE — PK TRY HEART CATH 50

## (undated) DEVICE — CATH DIAG IMPULSE FR4 6F 100CM